# Patient Record
Sex: FEMALE | Race: ASIAN | Employment: OTHER | URBAN - METROPOLITAN AREA
[De-identification: names, ages, dates, MRNs, and addresses within clinical notes are randomized per-mention and may not be internally consistent; named-entity substitution may affect disease eponyms.]

---

## 2018-08-13 ENCOUNTER — OFFICE VISIT (OUTPATIENT)
Dept: OBGYN CLINIC | Facility: CLINIC | Age: 83
End: 2018-08-13
Payer: MEDICARE

## 2018-08-13 VITALS
BODY MASS INDEX: 21.79 KG/M2 | RESPIRATION RATE: 14 BRPM | DIASTOLIC BLOOD PRESSURE: 84 MMHG | WEIGHT: 123 LBS | HEART RATE: 80 BPM | HEIGHT: 63 IN | TEMPERATURE: 98 F | SYSTOLIC BLOOD PRESSURE: 122 MMHG

## 2018-08-13 DIAGNOSIS — Z46.89 ENCOUNTER FOR FITTING AND ADJUSTMENT OF PESSARY: Primary | ICD-10-CM

## 2018-08-13 PROCEDURE — 99203 OFFICE O/P NEW LOW 30 MIN: CPT | Performed by: OBSTETRICS & GYNECOLOGY

## 2018-08-13 NOTE — PROGRESS NOTES
CHIEF COMPLAINT:   Patient presents with need for new pessary  HPI:   Pita Martel is a 81year old  No LMP recorded (approximate). Patient is postmenopausal. who presents with fitting and adjustment for a new pessary.   Patient has not had a gynec

## 2018-08-27 ENCOUNTER — OFFICE VISIT (OUTPATIENT)
Dept: OBGYN CLINIC | Facility: CLINIC | Age: 83
End: 2018-08-27
Payer: MEDICARE

## 2018-08-27 ENCOUNTER — TELEPHONE (OUTPATIENT)
Dept: OBGYN CLINIC | Facility: CLINIC | Age: 83
End: 2018-08-27

## 2018-08-27 VITALS
DIASTOLIC BLOOD PRESSURE: 80 MMHG | SYSTOLIC BLOOD PRESSURE: 120 MMHG | HEART RATE: 68 BPM | RESPIRATION RATE: 16 BRPM | BODY MASS INDEX: 22.07 KG/M2 | WEIGHT: 123 LBS | HEIGHT: 62.75 IN

## 2018-08-27 DIAGNOSIS — Z01.419 ENCOUNTER FOR ANNUAL ROUTINE GYNECOLOGICAL EXAMINATION: Primary | ICD-10-CM

## 2018-08-27 DIAGNOSIS — Z12.39 SCREENING BREAST EXAMINATION: ICD-10-CM

## 2018-08-27 DIAGNOSIS — Z78.0 ASYMPTOMATIC POSTMENOPAUSAL STATUS: ICD-10-CM

## 2018-08-27 PROCEDURE — G0101 CA SCREEN;PELVIC/BREAST EXAM: HCPCS | Performed by: OBSTETRICS & GYNECOLOGY

## 2018-08-27 NOTE — PROGRESS NOTES
Kiko Meadows is a 81year old female  No LMP recorded. Patient is postmenopausal. Patient presents with:  Physical  .  She was recently fitted for a pessary which has not yet arrived. She has knee pain and wants to see physical therapy.   She woul Bisoprolol-Hydrochlorothiazide 5-6.25 MG Oral Tab, Take 1 Tab by mouth daily. , Disp: , Rfl:   •  Levothyroxine Sodium (SYNTHROID OR), , Disp: , Rfl:   •  Cholecalciferol (VITAMIN D) 1000 UNITS Oral Cap, Take  by mouth., Disp: , Rfl:   •  Calcium Carbonate- location, without lesions and prolapse  Bladder:  No fullness, masses or tenderness  Vagina:  Normal appearance without lesions, no abnormal discharge.   Pessary in the vagina  Cervix:  Normal without tenderness on motion  Uterus: normal in size, contour, p

## 2018-09-06 ENCOUNTER — TELEPHONE (OUTPATIENT)
Dept: OBGYN CLINIC | Facility: CLINIC | Age: 83
End: 2018-09-06

## 2018-09-10 ENCOUNTER — OFFICE VISIT (OUTPATIENT)
Dept: OBGYN CLINIC | Facility: CLINIC | Age: 83
End: 2018-09-10
Payer: MEDICARE

## 2018-09-10 VITALS
RESPIRATION RATE: 12 BRPM | WEIGHT: 122 LBS | TEMPERATURE: 98 F | SYSTOLIC BLOOD PRESSURE: 136 MMHG | BODY MASS INDEX: 21.89 KG/M2 | HEIGHT: 62.5 IN | HEART RATE: 68 BPM | DIASTOLIC BLOOD PRESSURE: 92 MMHG

## 2018-09-10 DIAGNOSIS — N81.89 PELVIC RELAXATION: Primary | ICD-10-CM

## 2018-09-10 PROCEDURE — 57160 INSERT PESSARY/OTHER DEVICE: CPT | Performed by: OBSTETRICS & GYNECOLOGY

## 2018-09-10 PROCEDURE — A4562 PESSARY, NON RUBBER,ANY TYPE: HCPCS | Performed by: OBSTETRICS & GYNECOLOGY

## 2018-09-10 NOTE — PROGRESS NOTES
Patient is here for insertion and fitting of her new pessary. Her old one was removed cleaned and was given to the patient. The new pessary was placed without any difficulty. PE: No vaginal abrasions or any irritation.   Patient was instructed to clean t

## 2020-11-23 DIAGNOSIS — M17.11 PRIMARY OSTEOARTHRITIS OF RIGHT KNEE: Primary | ICD-10-CM

## 2020-11-24 ENCOUNTER — TELEPHONE (OUTPATIENT)
Dept: ORTHOPEDICS CLINIC | Facility: CLINIC | Age: 85
End: 2020-11-24

## 2020-11-24 NOTE — TELEPHONE ENCOUNTER
Patient is coming in for right hip/knee pain - will be here a half an hour piror to appt    Future Appointments   Date Time Provider Janna Ureña   11/28/2020 11:30 AM Joe Bledsoe MD [facility]

## 2021-05-26 ENCOUNTER — OFFICE VISIT (OUTPATIENT)
Dept: ORTHOPEDICS CLINIC | Facility: CLINIC | Age: 86
End: 2021-05-26
Payer: MEDICARE

## 2021-05-26 ENCOUNTER — HOSPITAL ENCOUNTER (OUTPATIENT)
Dept: GENERAL RADIOLOGY | Age: 86
Discharge: HOME | End: 2021-05-26
Attending: ORTHOPAEDIC SURGERY
Payer: MEDICARE

## 2021-05-26 VITALS — BODY MASS INDEX: 20.34 KG/M2 | WEIGHT: 114.81 LBS | HEART RATE: 74 BPM | OXYGEN SATURATION: 98 % | HEIGHT: 63 IN

## 2021-05-26 DIAGNOSIS — M17.11 PRIMARY OSTEOARTHRITIS OF RIGHT KNEE: Primary | ICD-10-CM

## 2021-05-26 DIAGNOSIS — M79.604 CHRONIC PAIN OF RIGHT LOWER EXTREMITY: ICD-10-CM

## 2021-05-26 DIAGNOSIS — G89.29 CHRONIC PAIN OF RIGHT LOWER EXTREMITY: ICD-10-CM

## 2021-05-26 DIAGNOSIS — M17.11 PRIMARY OSTEOARTHRITIS OF RIGHT KNEE: ICD-10-CM

## 2021-05-26 PROCEDURE — 73564 X-RAY EXAM KNEE 4 OR MORE: CPT | Performed by: ORTHOPAEDIC SURGERY

## 2021-05-26 PROCEDURE — 20610 DRAIN/INJ JOINT/BURSA W/O US: CPT | Performed by: ORTHOPAEDIC SURGERY

## 2021-05-26 PROCEDURE — 99203 OFFICE O/P NEW LOW 30 MIN: CPT | Performed by: ORTHOPAEDIC SURGERY

## 2021-05-26 RX ORDER — TRIAMCINOLONE ACETONIDE 40 MG/ML
40 INJECTION, SUSPENSION INTRA-ARTICULAR; INTRAMUSCULAR ONCE
Status: COMPLETED | OUTPATIENT
Start: 2021-05-26 | End: 2021-05-26

## 2021-05-26 RX ADMIN — TRIAMCINOLONE ACETONIDE 40 MG: 40 INJECTION, SUSPENSION INTRA-ARTICULAR; INTRAMUSCULAR at 17:23:00

## 2021-05-27 NOTE — H&P
[facility] New Patient Note    CC: Patient presents with:  Knee Pain: RIGHT KNEE PAIN   Hip Pain: RIGHT HIP PAIN  Back Pain      HPI: This 81year old female, mother-in-law of Dr. Zoie Stephenson, presents today with complaints of right knee and right lower above      Physical Exam:    Pulse 74   Ht 5' 3\" (1.6 m)   Wt 114 lb 12.8 oz (52.1 kg)   SpO2 98%   BMI 20.34 kg/m²   Constitutional: Awake, alert, no distress. Psychological: Appropriate affect. Respiratory: Unlabored breathing.   Stance: No coronal makayla potential diagnostic and therapeutic steroid injection to the knee in order to assess response. If she does get improvement from the injection, we can continue treatment for osteoarthritis of the knee.   If she does not get improvement, then I would recomm

## 2021-06-16 ENCOUNTER — OFFICE VISIT (OUTPATIENT)
Dept: ORTHOPEDICS CLINIC | Facility: CLINIC | Age: 86
End: 2021-06-16
Payer: MEDICARE

## 2021-06-16 ENCOUNTER — HOSPITAL ENCOUNTER (OUTPATIENT)
Dept: GENERAL RADIOLOGY | Age: 86
Discharge: HOME | End: 2021-06-16
Attending: ORTHOPAEDIC SURGERY
Payer: MEDICARE

## 2021-06-16 VITALS — WEIGHT: 113 LBS | OXYGEN SATURATION: 98 % | HEIGHT: 63 IN | BODY MASS INDEX: 20.02 KG/M2 | HEART RATE: 76 BPM

## 2021-06-16 DIAGNOSIS — M25.562 CHRONIC PAIN OF LEFT KNEE: ICD-10-CM

## 2021-06-16 DIAGNOSIS — G89.29 CHRONIC PAIN OF LEFT KNEE: ICD-10-CM

## 2021-06-16 DIAGNOSIS — G89.29 CHRONIC PAIN OF RIGHT LOWER EXTREMITY: ICD-10-CM

## 2021-06-16 DIAGNOSIS — M79.604 CHRONIC PAIN OF RIGHT LOWER EXTREMITY: ICD-10-CM

## 2021-06-16 DIAGNOSIS — M17.11 PRIMARY OSTEOARTHRITIS OF RIGHT KNEE: Primary | ICD-10-CM

## 2021-06-16 DIAGNOSIS — M17.12 PRIMARY OSTEOARTHRITIS OF LEFT KNEE: ICD-10-CM

## 2021-06-16 PROCEDURE — 99213 OFFICE O/P EST LOW 20 MIN: CPT | Performed by: ORTHOPAEDIC SURGERY

## 2021-06-16 PROCEDURE — 73564 X-RAY EXAM KNEE 4 OR MORE: CPT | Performed by: ORTHOPAEDIC SURGERY

## 2021-06-16 PROCEDURE — 20610 DRAIN/INJ JOINT/BURSA W/O US: CPT | Performed by: ORTHOPAEDIC SURGERY

## 2021-06-16 RX ORDER — HYDROCHLOROTHIAZIDE 12.5 MG/1
CAPSULE, GELATIN COATED ORAL
COMMUNITY
Start: 2021-02-23

## 2021-06-16 RX ORDER — ASPIRIN 81 MG/1
81 TABLET ORAL DAILY
COMMUNITY
Start: 2019-11-30

## 2021-06-16 RX ORDER — METHYLPREDNISOLONE 4 MG/1
TABLET ORAL
Qty: 21 TABLET | Refills: 0 | Status: SHIPPED | OUTPATIENT
Start: 2021-06-16 | End: 2021-07-02

## 2021-06-16 RX ADMIN — TRIAMCINOLONE ACETONIDE 40 MG: 40 INJECTION, SUSPENSION INTRA-ARTICULAR; INTRAMUSCULAR at 16:49:00

## 2021-06-16 NOTE — PROGRESS NOTES
[facility] Progress Note    Subjective: Patient returns today with her daughter for repeat evaluation of her right knee.   She states that the injection in her right knee did help her right knee pain, however her right lower extremity pain did not keerthi she agrees with and this was ordered today. She will follow-up with physiatry for evaluation of her radiating right lower extremity pain, and will call us for follow-up on her knees if and when needed.     Anisha Luz MD, [address] [place] [address]

## 2021-06-17 RX ORDER — TRIAMCINOLONE ACETONIDE 40 MG/ML
40 INJECTION, SUSPENSION INTRA-ARTICULAR; INTRAMUSCULAR ONCE
Status: COMPLETED | OUTPATIENT
Start: 2021-06-17 | End: 2021-06-16

## 2021-07-02 ENCOUNTER — OFFICE VISIT (OUTPATIENT)
Dept: PHYSICAL MEDICINE AND REHAB | Facility: CLINIC | Age: 86
End: 2021-07-02
Payer: MEDICARE

## 2021-07-02 VITALS
HEIGHT: 63 IN | OXYGEN SATURATION: 98 % | RESPIRATION RATE: 16 BRPM | BODY MASS INDEX: 20.02 KG/M2 | WEIGHT: 113 LBS | SYSTOLIC BLOOD PRESSURE: 132 MMHG | DIASTOLIC BLOOD PRESSURE: 80 MMHG | HEART RATE: 80 BPM

## 2021-07-02 DIAGNOSIS — M54.16 RIGHT LUMBAR RADICULITIS: Primary | ICD-10-CM

## 2021-07-02 PROCEDURE — 99204 OFFICE O/P NEW MOD 45 MIN: CPT | Performed by: PHYSICAL MEDICINE & REHABILITATION

## 2021-07-02 NOTE — PATIENT INSTRUCTIONS
If you are not any better after physical therapy and the medrol dosepak please send me a message and I will order an MRI of the lumbar spine.

## 2021-07-02 NOTE — PROGRESS NOTES
HPI/Subjective:   Patient ID: Cuca Lynch is a 81year old female.     HPI    History/Other:   Review of Systems  Current Outpatient Medications   Medication Sig Dispense Refill   • hydrochlorothiazide 12.5 MG Oral Cap      • aspirin 81 MG Oral Tab EC Ta

## 2021-07-02 NOTE — H&P
History and Physical    C/C: right low back and leg pain    HPI: 58-year-old female with past medical history of hypertension presents with right lower back pain radiating into the right lower extremity into the dorsum of the foot.   The symptoms started ab reviewed via care everywhere; see below:  XR lumbar spine AP/lateral 10/30/2018:   Findings: 5 lumbar vertebral bodies are noted.  Mild levoscoliosis of the   lumbar spine is seen.  The hip joints are normal.  The disc spaces are   moderately narrowed at L4

## 2023-01-11 ENCOUNTER — OFFICE VISIT (OUTPATIENT)
Dept: ORTHOPEDICS CLINIC | Facility: CLINIC | Age: 88
End: 2023-01-11
Payer: MEDICARE

## 2023-01-11 VITALS — HEIGHT: 63 IN | HEART RATE: 78 BPM | WEIGHT: 113 LBS | BODY MASS INDEX: 20.02 KG/M2 | OXYGEN SATURATION: 97 %

## 2023-01-11 DIAGNOSIS — M17.0 PRIMARY OSTEOARTHRITIS OF BOTH KNEES: Primary | ICD-10-CM

## 2023-01-11 PROCEDURE — 20610 DRAIN/INJ JOINT/BURSA W/O US: CPT | Performed by: ORTHOPAEDIC SURGERY

## 2023-01-11 RX ORDER — TRIAMCINOLONE ACETONIDE 40 MG/ML
80 INJECTION, SUSPENSION INTRA-ARTICULAR; INTRAMUSCULAR ONCE
Status: COMPLETED | OUTPATIENT
Start: 2023-01-11 | End: 2023-01-11

## 2023-01-11 RX ADMIN — TRIAMCINOLONE ACETONIDE 80 MG: 40 INJECTION, SUSPENSION INTRA-ARTICULAR; INTRAMUSCULAR at 15:51:00

## 2023-01-11 NOTE — PROCEDURES
Patient returns with her daughter. She had excellent relief from the last set of injections. She would like to repeat steroid injection to both knees today. After informed consent, the patient's right and left knees were marked, locally anesthetized with skin refrigerant, prepped with topical antiseptic, and injected with a mixture of 1mL 40mg/mL Kenalog, 2mL 1% lidocaine and 2mL 0.5% marcaine through the inferolateral portal.  A band-aid was applied. The patient tolerated the procedure well.     María Hightower MD, [address] [facility]  Phone [phone]  Fax [phone]

## 2023-02-23 ENCOUNTER — TELEPHONE (OUTPATIENT)
Dept: PHYSICAL MEDICINE AND REHAB | Facility: CLINIC | Age: 88
End: 2023-02-23

## 2023-02-23 ENCOUNTER — OFFICE VISIT (OUTPATIENT)
Dept: PHYSICAL MEDICINE AND REHAB | Facility: CLINIC | Age: 88
End: 2023-02-23
Payer: MEDICARE

## 2023-02-23 VITALS
SYSTOLIC BLOOD PRESSURE: 110 MMHG | DIASTOLIC BLOOD PRESSURE: 62 MMHG | BODY MASS INDEX: 20.02 KG/M2 | HEIGHT: 63 IN | WEIGHT: 113 LBS

## 2023-02-23 DIAGNOSIS — M48.062 LUMBAR STENOSIS WITH NEUROGENIC CLAUDICATION: ICD-10-CM

## 2023-02-23 DIAGNOSIS — M54.16 RIGHT LUMBAR RADICULITIS: Primary | ICD-10-CM

## 2023-02-23 PROCEDURE — 99214 OFFICE O/P EST MOD 30 MIN: CPT | Performed by: PHYSICAL MEDICINE & REHABILITATION

## 2023-02-23 RX ORDER — HYDRALAZINE HYDROCHLORIDE 10 MG/1
10 TABLET, FILM COATED ORAL 2 TIMES DAILY
COMMUNITY
Start: 2023-02-15

## 2023-02-23 RX ORDER — AMLODIPINE BESYLATE 2.5 MG/1
2.5 TABLET ORAL 2 TIMES DAILY
COMMUNITY
Start: 2022-12-21

## 2023-02-23 RX ORDER — LOSARTAN POTASSIUM 100 MG/1
100 TABLET ORAL DAILY
COMMUNITY
Start: 2023-02-15

## 2023-02-23 RX ORDER — MECLIZINE HYDROCHLORIDE 25 MG/1
25 TABLET ORAL
COMMUNITY
Start: 2022-06-23

## 2023-02-23 NOTE — TELEPHONE ENCOUNTER
Per Medicare Guidelines -no authorization is required for Bilateral L5 transforaminal epidural steroid injection under fluoroscopy CPT 65792-98    Status: Authorization is not required however may be subject to review once claim is submitted-Covered Benefit   Per Dr. Radha Max at [facility] w/ IVCS

## 2023-02-28 ENCOUNTER — TELEPHONE (OUTPATIENT)
Dept: PHYSICAL MEDICINE AND REHAB | Facility: CLINIC | Age: 88
End: 2023-02-28

## 2023-02-28 NOTE — TELEPHONE ENCOUNTER
Patient has been scheduled for Bilateral L5 transforaminal epidural steroid injection under fluoroscopy on 3/6/23 at the [facility] with Ralph Roles. -Anesthesia type: IVCS. -If scheduling [facility] covid testing required for all procedures whether patient is vaccinated or not. NA  -Patient informed not to eat or drink anything after midnight the night prior to the procedure, if being sedated. (Patient informed to fast 12 hours prior to procedure with IVCS/MAC. )   -Patient was advised that if he/she does receive the covid vaccine it needs to be at least 2 weeks before or after the injection. NA  -Medications and allergies reviewed. -Patient reminded to hold NSAIDs (Ibuprofen, ASA 81, Aleve, Naproxen, Mobic, Diclofenac, Etodolac, Celebrex etc.) for 3 days prior to [facility]  if BMI is greater than 35. For Cervical injections only hold multivitamins, Vitamin E, Fish Oil, Phentermine (Lomaira) for 7 days prior to injection and NSAIDS.  mg to be held for 7 days prior to injections.  -If patient is receiving MAC/IVCS Phentermine Amelie Matt) will need to be held for 7 days prior to injection. NA  -If on blood thinner clearance has been received to hold this medication by provider. NA  -Patient informed he/she will need a  to and from procedure. -[facility] is located in the [facility] 1st floor. Patient may park in the yellow or purple parking. Patient verbalized understanding and agrees with plan.  -----> Scheduled in Epic: Yes  -----> Scheduled in [employer]:  Yes

## 2023-03-06 ENCOUNTER — OFFICE VISIT (OUTPATIENT)
Dept: SURGERY | Facility: CLINIC | Age: 88
End: 2023-03-06
Payer: MEDICARE

## 2023-03-06 DIAGNOSIS — M48.062 LUMBAR STENOSIS WITH NEUROGENIC CLAUDICATION: Primary | ICD-10-CM

## 2023-03-06 NOTE — PROCEDURES
[facility]    BILATERAL LUMBAR TRANSFORAMINAL   NAME:  Bella Fierro    MR #:    [id number] :  1935     PHYSICIAN:  Juan Alberto Limon DO        Operative Report    DATE OF PROCEDURE: 3/6/2023   PREOPERATIVE DIAGNOSES: 1. Lumbar stenosis with neurogenic claudication        POSTOPERATIVE DIAGNOSES:   1. Lumbar stenosis with neurogenic claudication        PROCEDURES: Bilateral L5 transforaminal epidural steroid injections done under fluoroscopic guidance with contrast enhancement. SURGEON: Juan Alberto Limon DO   ANESTHESIA: IV conscious sedation   INDICATIONS: Chronic low back and leg pain, worse with walking. Central stenosis at L4-5. OPERATIVE PROCEDURE:  Written consent was obtained from the patient. The patient was brought into the operating room and placed in the prone position on the fluoroscopy table with pillow underneath her abdomen. The patient's skin was cleaned and draped in a normal sterile fashion. Using AP fluoroscopy, all five lumbar vertebrae were identified. When the fifth vertebra was identified, fluoroscopy was left anterior obliqued opening up the right L5-S1 intervertebral foramen. At this point in time, the patient's skin was anesthetized with 1% PF lidocaine without epinephrine. Then, a 3.5 inch, 22-gauge spinal needle was inserted and directed towards the right L5-S1 intervertebral foramen. When it felt to be in good position, AP fluoroscopy was used to advance the needle to the 6 o'clock position on the right L5 pedicle. At this point in time, Omnipaque-240 contrast was used to obtain a good epidurogram indicating correct needle placement. Then, aspiration was performed. No blood, fluid, or air was aspirated. Then, the patient was injected with a 3 cc solution of 1.5 cc of 6 mg/cc of celestone and 1.5 cc of 1% PF lidocaine without epinephrine. After this, the needle was removed.   Then  fluoroscopy was right anterior obliqued opening up the left L5-S1 intervertebral foramen. At this point in time, the patient's skin was anesthetized with 1 to 2 cc of 1% PF lidocaine without epinephrine. Then, a 3.5 inch, 22-gauge spinal needle was inserted and directed towards the left L5-S1 intervertebral foramen. When it felt to be in good position, AP fluoroscopy was used to advance the needle to the 6 o'clock position on the left L5 pedicle. At this point in time, Omnipaque-240 contrast was used to obtain a good epidurogram indicating correct needle placement. Then, aspiration was performed. No blood, fluid, or air was aspirated. Then, the patient was injected with a 3 cc solution of 1.5 cc of 6 mg/cc of celestone and 1.5 cc of 1% PF lidocaine without epinephrine. After this, the needle was removed. The patient's skin was cleaned. Band-Aids were applied. The patient was transferred to the cart and into [facility]. The patient was given discharge instructions and will follow up in the clinic as scheduled. Throughout the whole procedure, the patient's pulse oximetry and vital signs were monitored and they remained completely stable. Also, throughout the whole procedure, prior to injection of any medication, aspiration was performed. No blood, fluid, or air was aspirated at anytime. IV conscious sedation was utilized for this procedure. A total of 1mg of versed was administered over 20 minutes.     Augusta Mitchell DO  Physical Medicine and [address] [place] [address]

## 2023-06-26 ENCOUNTER — OFFICE VISIT (OUTPATIENT)
Dept: ORTHOPEDICS CLINIC | Facility: CLINIC | Age: 88
End: 2023-06-26
Payer: MEDICARE

## 2023-06-26 DIAGNOSIS — M17.0 PRIMARY OSTEOARTHRITIS OF BOTH KNEES: Primary | ICD-10-CM

## 2023-06-26 RX ORDER — TRIAMCINOLONE ACETONIDE 40 MG/ML
80 INJECTION, SUSPENSION INTRA-ARTICULAR; INTRAMUSCULAR ONCE
Status: COMPLETED | OUTPATIENT
Start: 2023-06-26 | End: 2023-06-26

## 2023-06-26 RX ORDER — GABAPENTIN 100 MG/1
200 CAPSULE ORAL 2 TIMES DAILY
COMMUNITY
Start: 2023-04-18

## 2023-06-26 RX ADMIN — TRIAMCINOLONE ACETONIDE 80 MG: 40 INJECTION, SUSPENSION INTRA-ARTICULAR; INTRAMUSCULAR at 15:04:00

## 2023-06-26 NOTE — PROCEDURES
After informed consent, the patient's right and left knees were marked, locally anesthetized with skin refrigerant, prepped with topical antiseptic, and injected with a mixture of 1mL 40mg/mL Kenalog, 2mL 1% lidocaine and 2mL 0.5% marcaine through the inferolateral portal.  A band-aid was applied. The patient tolerated the procedure well.     Jose Uribe MD, [address] [facility]  Phone [phone]  Fax [phone]

## 2024-06-18 ENCOUNTER — TELEPHONE (OUTPATIENT)
Dept: NEUROLOGY | Facility: CLINIC | Age: 89
End: 2024-06-18

## 2024-06-18 NOTE — TELEPHONE ENCOUNTER
Message left on mike's voice mail to call back to schedule appointment for her mom, Rebeca, to see dr Nunn on Monday, 6/24 in [place]. Can double book at 10:30 or 11:30.    Shellie Nunn MD  [facility] Nurse  Please call patient daughter Breanne [phone] for appt- June 24 th we can add her on.  Thanks

## 2024-06-24 ENCOUNTER — LAB ENCOUNTER (OUTPATIENT)
Dept: LAB | Age: 89
End: 2024-06-24
Attending: Other

## 2024-06-24 ENCOUNTER — OFFICE VISIT (OUTPATIENT)
Dept: NEUROLOGY | Facility: CLINIC | Age: 89
End: 2024-06-24

## 2024-06-24 VITALS
SYSTOLIC BLOOD PRESSURE: 120 MMHG | BODY MASS INDEX: 22.26 KG/M2 | WEIGHT: 125.63 LBS | DIASTOLIC BLOOD PRESSURE: 74 MMHG | HEIGHT: 63 IN | OXYGEN SATURATION: 96 % | RESPIRATION RATE: 16 BRPM | HEART RATE: 74 BPM

## 2024-06-24 DIAGNOSIS — R41.9 NEUROCOGNITIVE DISORDER: ICD-10-CM

## 2024-06-24 DIAGNOSIS — R41.9 NEUROCOGNITIVE DISORDER: Primary | ICD-10-CM

## 2024-06-24 DIAGNOSIS — R41.3 MEMORY LOSS: ICD-10-CM

## 2024-06-24 LAB
TSI SER-ACNC: 1.43 MIU/ML (ref 0.36–3.74)
VIT B12 SERPL-MCNC: 644 PG/ML (ref 193–986)

## 2024-06-24 PROCEDURE — 82607 VITAMIN B-12: CPT

## 2024-06-24 PROCEDURE — 36415 COLL VENOUS BLD VENIPUNCTURE: CPT

## 2024-06-24 PROCEDURE — 84443 ASSAY THYROID STIM HORMONE: CPT

## 2024-06-24 PROCEDURE — 99204 OFFICE O/P NEW MOD 45 MIN: CPT | Performed by: OTHER

## 2024-06-24 RX ORDER — DONEPEZIL HYDROCHLORIDE 5 MG/1
5 TABLET, FILM COATED ORAL NIGHTLY
Qty: 30 TABLET | Refills: 2 | Status: SHIPPED | OUTPATIENT
Start: 2024-06-24

## 2024-06-24 NOTE — PATIENT INSTRUCTIONS
Refill policies:    Allow 2-3 business days for refills; controlled substances may take longer.  Contact your pharmacy at least 5 days prior to running out of medication and have them send an electronic request or submit request through the “request refill” option in your [email or web address] account.  Refills are not addressed on weekends; covering physicians do not authorize routine medications on weekends.  No narcotics or controlled substances are refilled after noon on Fridays or by on call physicians.  By law, narcotics must be electronically prescribed.  A 30 day supply with no refills is the maximum allowed.  If your prescription is due for a refill, you may be due for a follow up appointment.  To best provide you care, patients receiving routine medications need to be seen at least once a year.  Patients receiving narcotic/controlled substance medications need to be seen at least once every 3 months.  In the event that your preferred pharmacy does not have the requested medication in stock (e.g. Backordered), it is your responsibility to find another pharmacy that has the requested medication available.  We will gladly send a new prescription to that pharmacy at your request.    Scheduling Tests:    If your physician has ordered radiology tests such as MRI or CT scans, please contact Central Scheduling at [phone] right away to schedule the test.  Once scheduled, the [facility] Centralized Referral Team will work with your insurance carrier to obtain pre-certification or prior authorization.  Depending on your insurance carrier, approval may take 3-10 days.  It is highly recommended patients assure they have received an authorization before having a test performed.  If test is done without insurance authorization, patient may be responsible for the entire amount billed.      Precertification and Prior Authorizations:  If your physician has recommended that you have a procedure or additional testing performed the [facility]  Centralized Referral Team will contact your insurance carrier to obtain pre-certification or prior authorization.    You are strongly encouraged to contact your insurance carrier to verify that your procedure/test has been approved and is a COVERED benefit.  Although the [facility] Centralized Referral Team does its due diligence, the insurance carrier gives the disclaimer that \"Although the procedure is authorized, this does not guarantee payment.\"    Ultimately the patient is responsible for payment.   Thank you for your understanding in this matter.  Paperwork Completion:  If you require FMLA or disability paperwork for your recovery, please make sure to either drop it off or have it faxed to our office at [phone]. Be sure the form has your name and date of birth on it.  The form will be faxed to our Forms Department and they will complete it for you.  There is a 25$ fee for all forms that need to be filled out.  Please be aware there is a 10-14 day turnaround time.  You will need to sign a release of information (NAVNEET) form if your paperwork does not come with one.  You may call the Forms Department with any questions at [phone].  Their fax number is [phone].

## 2024-06-24 NOTE — PROGRESS NOTES
HPI:    Patient ID: Rebeca Clarke is a 89 year old female.    HPI  Rebeca Clarke is a 89-year-old female with history of hypertension and thyroid disease who presented for evaluation of memory loss.  Patient is here along with her daughter Breanne who reports family has noticed short-term memory problem going on for quite some time and progressively getting worse but also had a UTI recently.  She states that she often forgets names and memory not as sharp as before  Family have also noticed anxiety and sad mood worse over the last 6+ months.  Ms Clarke lives with her  and independent with her activities of daily living.  She states she does not drive anymore, still able to cook makes meals and do basic household chores without any problem. She states her  manages finances and takes care of bills  She denies any mood issues, depression or anxiety    ROS for Dementia:  No history of TIA or stroke.   No major head injury  No sleep disturbance  Family history positive, her sister has dementia        HISTORY:  Past Medical History:    HYPOTHYROIDISM    Osteoarthritis    SOB (shortness of breath)    Thyroid disease    Unspecified essential hypertension      Past Surgical History:   Procedure Laterality Date    Cholecystectomy      D & c      Other surgical history  1986    fungus on lung      Family History   Problem Relation Age of Onset    Diabetes Mother     Arthritis Mother     Heart Disorder Mother     Diabetes Maternal Grandmother       Social History     Socioeconomic History    Marital status:     Number of children: 4   Occupational History    Occupation: Retired   Tobacco Use    Smoking status: Never    Smokeless tobacco: Never   Vaping Use    Vaping status: Never Used   Substance and Sexual Activity    Alcohol use: No    Drug use: No    Sexual activity: Never   Other Topics Concern    Caffeine Concern No     Comment: Occas tea    Exercise Yes     Comment: Walks    Seat Belt Yes         Review of Systems   Constitutional: Negative.    HENT: Negative.     Eyes: Negative.    Respiratory: Negative.     Cardiovascular: Negative.    Gastrointestinal: Negative.    Endocrine: Negative.    Genitourinary: Negative.    Musculoskeletal: Negative.    Skin: Negative.    Allergic/Immunologic: Negative.    Neurological: Negative.         + memory loss   Hematological: Negative.    Psychiatric/Behavioral: Negative.  Negative for sleep disturbance.    All other systems reviewed and are negative.           Current Outpatient Medications   Medication Sig Dispense Refill    amLODIPine 2.5 MG Oral Tab Take 1 tablet (2.5 mg total) by mouth 2 (two) times daily.      losartan 100 MG Oral Tab Take 1 tablet (100 mg total) by mouth daily.      meclizine 25 MG Oral Tab Take 1 tablet (25 mg total) by mouth daily as needed.      NON FORMULARY SUPER C MULTIVITAMIN      Levothyroxine Sodium (SYNTHROID OR) Take by mouth daily.      Cholecalciferol (VITAMIN D) 1000 UNITS Oral Cap Take by mouth.       Allergies:  Allergies   Allergen Reactions    Metoprolol OTHER (SEE COMMENTS)     bradycardia    Sulfa Antibiotics RASH     PHYSICAL EXAM:   Physical Exam    Blood pressure 120/74, pulse 74, resp. rate 16, height 63\", weight 125 lb 9.6 oz (57 kg), SpO2 96%, not currently breastfeeding.    General Appearance: Well nourished, well developed, no apparent distress.   HEENT: Normocephalic and atraumatic. Normal sclera.   Cardiovascular: Normal rate, regular rhythm and normal heart sounds.    Pulmonary/Chest: Effort normal and breath sounds normal.   Abdominal: Soft. Bowel sounds are normal.   Skin: dry, clean and intact  Ext: peripheral pulses present  Psych: normal mood and affect    Neurological:  Patient is awake, alert and oriented to person, place and time   Normal speech and language.    Steven Cognitive Assessment:  Visuospatial/Executive ( of 5): 2  Naming ( of 3): 2  Attention ( of 6): 6  Naming ( of 3): 2  Abstraction ( of  2): 1  Delayed Recall ( of 5): 2  Orientation ( of 6): 5  Extra point for <= 12 Yr Education ( of 1): 0  Total:  Total ( of 30): 21    Cranial Nerves:   II: Visual fields: normal  III: Pupils: equal, round, reactive to light  III,IV,VI: Extra Ocular Movements: intact  V: Facial sensation: intact  VII: Facial strength: intact  VIII: Hearing: intact  IX: Palate: intact  XI: Shoulder shrug: intact  XII: Tongue movement: normal    Motor: Normal tone. Strength is  5 out of 5 in all extremities bilaterally.    Sensory: Sensory examination is normal to light touch and pinprick     Coordination: Grossly intact    Gait: steady, uses a walker for support       TESTS/IMAGING:     none    ASSESSMENT/PLAN:         ICD-10-CM    1. Neurocognitive disorder  R41.9 MRI BRAIN (CPT=70551)     TSH W Reflex To Free T4     Vitamin B12     Psychology Referral - In Network      2. Memory loss  R41.3         Ms Clarke is a 89-year-old pleasant female with history of hypertension and thyroid disorder who presented for evaluation of progressive short-term memory problem.  Cognitive assessment done here in the office demonstrates impairment in processing speed, visual-spatial skills, naming and word recall.  Findings are concerning for Alzheimer's type of dementia      We recommend MRI of the brain to assess the vascular burden and brain atrophy  We also recommended a neuropsychology evaluation for comprehensive testing  Start donepezil 5 mg nightly and increase the dose slowly to 10 mg as tolerated.  Side effect explained  We discussed briefly about SSRIs either escitalopram or zoloft. Patient and daughter would like to hold on it    Encourage cognitive exercises, group activities and healthy lifestyle    No orders of the defined types were placed in this encounter.      Thank you for allowing us to participate in your patient's care.      Shellie Nunn MD  [facility] [place]          Meds This Visit:  Requested Prescriptions       No prescriptions requested or ordered in this encounter       Imaging & Referrals:  None     ID#[id number]

## 2024-06-25 ENCOUNTER — TELEPHONE (OUTPATIENT)
Dept: NEUROLOGY | Facility: CLINIC | Age: 89
End: 2024-06-25

## 2024-06-25 NOTE — TELEPHONE ENCOUNTER
Detailed message left on patient's voice mail (ok per HIPAA consent) that lab results normal. Also attempted to reach daughter ronnie but no answer and no voice mail.

## 2024-07-02 ENCOUNTER — HOSPITAL ENCOUNTER (OUTPATIENT)
Dept: MRI IMAGING | Age: 89
Discharge: HOME | End: 2024-07-02
Attending: Other
Payer: MEDICARE

## 2024-07-02 DIAGNOSIS — R41.9 NEUROCOGNITIVE DISORDER: ICD-10-CM

## 2024-07-02 PROCEDURE — 70551 MRI BRAIN STEM W/O DYE: CPT | Performed by: OTHER

## 2024-09-18 DIAGNOSIS — R41.9 NEUROCOGNITIVE DISORDER: Primary | ICD-10-CM

## 2024-09-18 RX ORDER — DONEPEZIL HYDROCHLORIDE 5 MG/1
5 TABLET, FILM COATED ORAL NIGHTLY
Qty: 90 TABLET | Refills: 0 | Status: SHIPPED | OUTPATIENT
Start: 2024-09-18

## 2024-09-18 NOTE — TELEPHONE ENCOUNTER
Medication: donepezil 5 MG Oral Tab      Date of last refill: 6/24/24 (#30/2)  [place]     Last office visit: 6/24/24  Due back to clinic per last office note:  3 months (around 9/24/2024).   Date next office visit scheduled:    Future Appointments   Date Time Provider Department Center   9/27/2024  2:10 PM Shellie Nunn MD [facility]           Last OV note recommendation:        ICD-10-CM     1. Neurocognitive disorder  R41.9 MRI BRAIN (CPT=70551)       TSH W Reflex To Free T4       Vitamin B12       Psychology Referral - In Network       2. Memory loss  R41.3            Ms Clarke is a 89-year-old pleasant female with history of hypertension and thyroid disorder who presented for evaluation of progressive short-term memory problem.  Cognitive assessment done here in the office demonstrates impairment in processing speed, visual-spatial skills, naming and word recall.  Findings are concerning for Alzheimer's type of dementia        We recommend MRI of the brain to assess the vascular burden and brain atrophy  We also recommended a neuropsychology evaluation for comprehensive testing  Start donepezil 5 mg nightly and increase the dose slowly to 10 mg as tolerated.  Side effect explained  We discussed briefly about SSRIs either escitalopram or zoloft. Patient and daughter would like to hold on it     Encourage cognitive exercises, group activities and healthy lifestyle     No orders of the defined types were placed in this encounter.

## 2024-11-23 ENCOUNTER — APPOINTMENT (OUTPATIENT)
Dept: GENERAL RADIOLOGY | Facility: HOSPITAL | Age: 89
End: 2024-11-23
Attending: EMERGENCY MEDICINE
Payer: MEDICARE

## 2024-11-23 ENCOUNTER — HOSPITAL ENCOUNTER (EMERGENCY)
Facility: HOSPITAL | Age: 89
Discharge: HOME | End: 2024-11-23
Attending: EMERGENCY MEDICINE
Payer: MEDICARE

## 2024-11-23 VITALS
DIASTOLIC BLOOD PRESSURE: 83 MMHG | BODY MASS INDEX: 20.73 KG/M2 | SYSTOLIC BLOOD PRESSURE: 164 MMHG | OXYGEN SATURATION: 97 % | RESPIRATION RATE: 17 BRPM | WEIGHT: 117 LBS | HEIGHT: 63 IN | TEMPERATURE: 98 F | HEART RATE: 67 BPM

## 2024-11-23 DIAGNOSIS — M79.604 RIGHT LEG PAIN: Primary | ICD-10-CM

## 2024-11-23 PROCEDURE — 99284 EMERGENCY DEPT VISIT MOD MDM: CPT

## 2024-11-23 PROCEDURE — 99283 EMERGENCY DEPT VISIT LOW MDM: CPT

## 2024-11-23 PROCEDURE — 73552 X-RAY EXAM OF FEMUR 2/>: CPT | Performed by: EMERGENCY MEDICINE

## 2024-11-23 PROCEDURE — 73502 X-RAY EXAM HIP UNI 2-3 VIEWS: CPT | Performed by: EMERGENCY MEDICINE

## 2024-11-23 RX ORDER — HYDROCODONE BITARTRATE AND ACETAMINOPHEN 5; 325 MG/1; MG/1
1 TABLET ORAL ONCE
Status: COMPLETED | OUTPATIENT
Start: 2024-11-23 | End: 2024-11-23

## 2024-11-23 RX ORDER — DIAZEPAM 2 MG/1
2 TABLET ORAL EVERY 8 HOURS PRN
Qty: 9 TABLET | Refills: 0 | Status: SHIPPED | OUTPATIENT
Start: 2024-11-23 | End: 2024-11-23

## 2024-11-23 RX ORDER — DIAZEPAM 5 MG/1
2.5 TABLET ORAL ONCE
Status: COMPLETED | OUTPATIENT
Start: 2024-11-23 | End: 2024-11-23

## 2024-11-23 RX ORDER — LIDOCAINE 50 MG/G
1 PATCH TOPICAL EVERY 24 HOURS
Qty: 7 PATCH | Refills: 0 | Status: SHIPPED | OUTPATIENT
Start: 2024-11-23 | End: 2024-11-30

## 2024-11-23 RX ORDER — HYDROCODONE BITARTRATE AND ACETAMINOPHEN 5; 325 MG/1; MG/1
1 TABLET ORAL EVERY 8 HOURS PRN
Qty: 9 TABLET | Refills: 0 | Status: SHIPPED | OUTPATIENT
Start: 2024-11-23 | End: 2024-11-23

## 2024-11-23 RX ORDER — HYDROCODONE BITARTRATE AND ACETAMINOPHEN 5; 325 MG/1; MG/1
1 TABLET ORAL EVERY 8 HOURS PRN
Qty: 9 TABLET | Refills: 0 | Status: SHIPPED | OUTPATIENT
Start: 2024-11-23 | End: 2024-11-26

## 2024-11-23 RX ORDER — DIAZEPAM 2 MG/1
2 TABLET ORAL 3 TIMES DAILY PRN
Qty: 9 TABLET | Refills: 0 | Status: SHIPPED | OUTPATIENT
Start: 2024-11-23 | End: 2024-11-26

## 2024-11-23 RX ORDER — GABAPENTIN 300 MG/1
300 CAPSULE ORAL 3 TIMES DAILY
Qty: 15 CAPSULE | Refills: 0 | Status: SHIPPED | OUTPATIENT
Start: 2024-11-23 | End: 2024-11-28

## 2024-11-23 NOTE — ED QUICK NOTES
Discharge instructions and paper prescriptions given to pt and daughter. Pt and daughter verbalized understanding of home care, medication use and to follow up with PCP. Pt and daughter denied further questions or concerns. Pt discharged via wheelchair with daughter, pt discharged in stable condition.

## 2024-11-23 NOTE — ED PROVIDER NOTES
Patient Seen in: [facility] Emergency Department    History     Chief Complaint   Patient presents with    Pain     Stated Complaint:      HPI    89-year-old female past medical history of hypothyroid, osteoarthritis, hypertension, lumbar radiculopathy with recent admission for L2 compression fracture status post IR kyphoplasty 10/25 presenting for evaluation of right gluteal pain as noted while being assisted by SNF staff to change into pants.  No radicular complaints or focal weakness/paresthesias.  No anticoagulant or antiplatelet use.  No direct trauma, no incontinence.  Independently ambulatory at baseline; patient with mechanical fall and L2 compression fracture as noted status post kyphoplasty and discharged to subacute rehabwith discharge summary detailing prescriptions for gabapentin, duloxetine and as needed oxycodone.    Past Medical History:    HYPOTHYROIDISM    Osteoarthritis    SOB (shortness of breath)    Thyroid disease    Unspecified essential hypertension       Past Surgical History:   Procedure Laterality Date    Cholecystectomy      D & c      Other surgical history  1986    fungus on lung            Family History   Problem Relation Age of Onset    Diabetes Mother     Arthritis Mother     Heart Disorder Mother     Diabetes Maternal Grandmother        Social History     Socioeconomic History    Marital status:     Number of children: 4   Occupational History    Occupation: Retired   Tobacco Use    Smoking status: Never    Smokeless tobacco: Never   Vaping Use    Vaping status: Never Used   Substance and Sexual Activity    Alcohol use: No    Drug use: No    Sexual activity: Never   Other Topics Concern    Caffeine Concern No     Comment: Occas tea    Exercise Yes     Comment: Walks    Seat Belt Yes     Social Drivers of Health     Food Insecurity: Low Risk  (10/18/2024)    Received from [facility]    Food Insecurity     Have there been times that your food ran  out, and you didn't have money to get more?: No     Are there times that you worry that this might happen?: No   Transportation Needs: Low Risk  (10/18/2024)    Received from [facility]    Transportation Needs     Do you have trouble getting transportation to medical appointments?: No     How do you normally get to and from your appointments?: Family/Friend   Housing Stability: Low Risk  (10/18/2024)    Received from [facility]    Housing Stability     Are you worried that your electric, gas, oil, or water might be shut off?: No     Are you concerned about having a safe and reliable place to live?: No       Review of Systems :  Constitutional: As per HPI  Musculoskeletal: (+) right leg pain.    Positive for stated complaint:   Other systems are as noted in HPI.  Constitutional and vital signs reviewed.      All other systems reviewed and negative except as noted above.    PSFH elements reviewed from today and agreed except as otherwise stated in HPI.    Physical Exam     ED Triage Vitals [11/23/24 0730]   BP (!) 169/86   Pulse 76   Resp 19   Temp 98.3 °F (36.8 °C)   Temp src Oral   SpO2 96 %   O2 Device None (Room air)       Current:BP (!) 169/86   Pulse 76   Temp 98.3 °F (36.8 °C) (Oral)   Resp 19   Ht 160 cm (5' 3\")   Wt 53.1 kg   SpO2 96%   BMI 20.73 kg/m²         Physical Exam   Constitutional: No distress. Anxious, tearful.  HEENT: MMM.  Head: Normocephalic.   Eyes: No injection.   Cardiovascular: Right lower extremity with 2+ DP/PT pulses.  Pulmonary/Chest: Effort normal.   Musculoskeletal: No gross deformity.  No midline thoracolumbar tenderness/step-off/deformity.  Right sciatic notch/gluteal and lateral hip tenderness without cutaneous crepitance change with soft compartments and with intact range of motion.  Neurological: Alert.  Right lower extremity with 5/5 strength proximally and distally with 2+ patellar DTRs and intact EHL.  Skin: Skin is warm.    Psychiatric: Cooperative.  Anxious, tearful  Nursing note and vitals reviewed.        ED Course   Labs Reviewed - No data to display  XR HIP W OR WO PELVIS 2 OR 3 VIEWS, RIGHT (CPT=73502)    Result Date: 11/23/2024  PROCEDURE: XR HIP W OR WO PELVIS 2 OR 3 VIEWS, RIGHT (CPT=73502)  COMPARISON: None.  INDICATIONS: Right leg and hip pain post being assisted from chair to bed today.  TECHNIQUE: 3 views were obtained.   FINDINGS:  BONES: There is bilateral hip osteoarthritis slightly more advanced on the right.  There is bilateral medial and superior acetabular joint space narrowing without protrusio.  Sclerosis, osteophytes and heterotopic calcifications are present around both hips more advanced on the right.  Both femoral heads are grossly spherical.  Mild degenerative change in the lower lumbar spine.  No acute fracture of the bony pelvis or right hip. SOFT TISSUES: Negative. No visible soft tissue swelling. EFFUSION: None visible. OTHER: Negative.         CONCLUSION: No acute osseous abnormality of the pelvis or right hip.  Mild-moderate bilateral hip osteoarthritis, more advanced on the right.   Dictated by (CST): Pawan Williamson MD on 11/23/2024 at 9:51 AM     Finalized by (CST): Pawan Williamson MD on 11/23/2024 at 9:51 AM          XR FEMUR MIN 2 VIEWS RIGHT (CPT=73552)    Result Date: 11/23/2024  PROCEDURE: XR FEMUR MIN 2 VIEWS RIGHT (CPT=73552)  COMPARISON: None.  INDICATIONS: Right leg and hip pain post being assisted from chair to bed today.  TECHNIQUE: 2 views were obtained.   FINDINGS:  BONES: The osseous structures are demineralized.  There is no fracture or dislocation.  There is enthesopathy suspected along the greater trochanter of the femur.  There is mild pubic symphysis osteoarthritis.  There is moderate superior right hip joint space narrowing and mild marginal osteophyte formation.  There is mild distal quadriceps enthesopathy. SOFT TISSUES: Mild atherosclerotic calcifications are noted. No visible soft  tissue swelling. EFFUSION: None visible. OTHER: Negative.         CONCLUSION:  1. No fracture dislocation. 2. Moderate right hip joint osteoarthritis.    Dictated by (CST): Ace Moore MD on 11/23/2024 at 8:50 AM     Finalized by (CST): Ace Moore MD on 11/23/2024 at 8:52 AM                [facility]   DIFFERENTIAL DIAGNOSIS: After history and physical exam differential diagnosis includes but is not limited to avulsion fracture, musculoskeletal strain, radiculopathy.    Pulse ox: 96%:Normal on RA, as independently interpreted by myself    Medical Decision Making  Evaluation for right gluteal pain without neurovascular compromise/radicular complaints or red flag symptoms with symptom onset after moving patient to facilitate getting dressed.  Right lower extremity with soft compartments and without cutaneous change, radiography nonacute with patient resting comfortably with symptomatic improvement after medications, stable for discharge with ongoing outpatient follow-up for which patient/daughter comfortable with discharge plan of care.    Problems Addressed:  Right leg pain: acute illness or injury    Amount and/or Complexity of Data Reviewed  External Data Reviewed: radiology and notes.     Details: 10/19/2024 MRI lumbar, 10/26/2024 discharge summary reviewed  Radiology: ordered and independent interpretation performed. Decision-making details documented in ED Course.     Details: XR without obvious dislocation as independently interpreted by myself    Risk  Prescription drug management.      I was wearing at minimum a facemask and eye protection throughout this encounter with handwashing performed prior and after patient evaluation without personal hand/facial/oropharyngeal contact and gloves worn throughout encounter. See note and/or contact this provider for further PPE details.    Disposition and Plan     Clinical Impression:  1. Right leg pain        Disposition:  Discharge    Follow-up:  Your  PCP    Call  For followup and re-evaluation.      Medications Prescribed:  Discharge Medication List as of 11/23/2024 10:14 AM        START taking these medications    Details   lidocaine 5 % External Patch Place 1 patch onto the skin daily for 7 days. Apply to skin for 12 hours at a time, then remove for next 12 hours. Do not apply over broken/irritated skin., Print, Disp-7 patch, R-0      HYDROcodone-acetaminophen 5-325 MG Oral Tab Take 1 tablet by mouth every 8 (eight) hours as needed for Pain., Normal, Disp-9 tablet, R-0      diazePAM 2 MG Oral Tab Take 1 tablet (2 mg total) by mouth every 8 (eight) hours as needed (For pain/spasm)., Normal, Disp-9 tablet, R-0

## 2024-11-23 NOTE — ED INITIAL ASSESSMENT (HPI)
Pt presents from [facility] in [place] for c/o right hip and thigh pain s/p being assisted from bed to chair by NH staff. Pt denies known trauma, and states \"it happened so fast I don't know exactly what happened.\" Pt states \"it wasn't on purpose.\"

## 2024-12-17 ENCOUNTER — APPOINTMENT (OUTPATIENT)
Dept: CT IMAGING | Facility: HOSPITAL | Age: 89
End: 2024-12-17
Attending: EMERGENCY MEDICINE
Payer: MEDICARE

## 2024-12-17 ENCOUNTER — APPOINTMENT (OUTPATIENT)
Dept: GENERAL RADIOLOGY | Facility: HOSPITAL | Age: 89
End: 2024-12-17
Attending: EMERGENCY MEDICINE
Payer: MEDICARE

## 2024-12-17 ENCOUNTER — HOSPITAL ENCOUNTER (INPATIENT)
Facility: HOSPITAL | Age: 89
LOS: 2 days | Discharge: HOME | End: 2024-12-21
Attending: EMERGENCY MEDICINE | Admitting: HOSPITALIST
Payer: MEDICARE

## 2024-12-17 DIAGNOSIS — T07.XXXA MULTIPLE CONTUSIONS: ICD-10-CM

## 2024-12-17 DIAGNOSIS — M48.062 NEUROGENIC CLAUDICATION DUE TO LUMBAR SPINAL STENOSIS: ICD-10-CM

## 2024-12-17 DIAGNOSIS — S09.90XA INJURY OF HEAD, INITIAL ENCOUNTER: ICD-10-CM
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
## 2024-12-17 DIAGNOSIS — M54.16 LUMBAR RADICULOPATHY: ICD-10-CM

## 2024-12-17 LAB — GLUCOSE BLDC GLUCOMTR-MCNC: 105 MG/DL (ref 70–99)

## 2024-12-17 PROCEDURE — 72100 X-RAY EXAM L-S SPINE 2/3 VWS: CPT | Performed by: EMERGENCY MEDICINE

## 2024-12-17 PROCEDURE — 73502 X-RAY EXAM HIP UNI 2-3 VIEWS: CPT | Performed by: EMERGENCY MEDICINE

## 2024-12-17 PROCEDURE — 73552 X-RAY EXAM OF FEMUR 2/>: CPT | Performed by: EMERGENCY MEDICINE

## 2024-12-17 PROCEDURE — 72125 CT NECK SPINE W/O DYE: CPT | Performed by: EMERGENCY MEDICINE

## 2024-12-17 PROCEDURE — 70450 CT HEAD/BRAIN W/O DYE: CPT | Performed by: EMERGENCY MEDICINE

## 2024-12-17 PROCEDURE — 72192 CT PELVIS W/O DYE: CPT | Performed by: EMERGENCY MEDICINE

## 2024-12-17 RX ORDER — HYDROCODONE BITARTRATE AND ACETAMINOPHEN 5; 325 MG/1; MG/1
1 TABLET ORAL ONCE
Status: COMPLETED | OUTPATIENT
Start: 2024-12-17 | End: 2024-12-17

## 2024-12-17 RX ORDER — ONDANSETRON 2 MG/ML
4 INJECTION INTRAMUSCULAR; INTRAVENOUS ONCE
Status: COMPLETED | OUTPATIENT
Start: 2024-12-17 | End: 2024-12-17

## 2024-12-17 RX ORDER — DIAZEPAM 10 MG/2ML
2.5 INJECTION, SOLUTION INTRAMUSCULAR; INTRAVENOUS ONCE
Status: COMPLETED | OUTPATIENT
Start: 2024-12-17 | End: 2024-12-17

## 2024-12-17 RX ORDER — KETOROLAC TROMETHAMINE 15 MG/ML
15 INJECTION, SOLUTION INTRAMUSCULAR; INTRAVENOUS ONCE
Status: COMPLETED | OUTPATIENT
Start: 2024-12-17 | End: 2024-12-17

## 2024-12-17 NOTE — ED INITIAL ASSESSMENT (HPI)
Pt reports mechanical fall and fell to the right side. Pt reports hitting back of head. Denies taking blood thinners or loc. Pt c/o dizziness-onset following administration of fentayl pta. Pt c/o \"whole right sided body pain\" pt is tearful.

## 2024-12-17 NOTE — ED QUICK NOTES
ATTEMPTED TO AMBULATE PT TO BR PT UNABLE TO WALK WITH A WALKER. PT C/O RIGHT LEG PAIN. ASSISTED PT BACK INTO BED. MD NOTIFIED.

## 2024-12-17 NOTE — ED PROVIDER NOTES
Patient Seen in: [facility] Emergency Department      History     Chief Complaint   Patient presents with    Fall     Stated Complaint: fall    Subjective:   HPI      89 y/o female from the nursing home who fell today hit her head and is complaining of some head pain as well as some right hip and right thigh pain.  She got fentanyl with EMS and got dizziness and nausea afterwards.  She denied preceding symptoms before the fall.  Mild neck pain.  Reports some tailbone pain.  No chest pain or shortness of breath or abdominal pain.    Objective:     Past Medical History:    HYPOTHYROIDISM    Osteoarthritis    SOB (shortness of breath)    Thyroid disease    Unspecified essential hypertension              Past Surgical History:   Procedure Laterality Date    Cholecystectomy      D & c      Other surgical history  1986    fungus on lung                Social History     Socioeconomic History    Marital status:     Number of children: 4   Occupational History    Occupation: Retired   Tobacco Use    Smoking status: Never    Smokeless tobacco: Never   Vaping Use    Vaping status: Never Used   Substance and Sexual Activity    Alcohol use: No    Drug use: No    Sexual activity: Never   Other Topics Concern    Caffeine Concern No     Comment: Occas tea    Exercise Yes     Comment: Walks    Seat Belt Yes     Social Drivers of Health     Food Insecurity: No Food Insecurity (12/18/2024)    Food Insecurity     Food Insecurity: Never true   Transportation Needs: No Transportation Needs (12/18/2024)    Transportation Needs     Lack of Transportation: No   Housing Stability: Low Risk  (12/18/2024)    Housing Stability     Housing Instability: No                  Physical Exam     ED Triage Vitals [12/17/24 1527]   BP (!) 188/93   Pulse 78   Resp 13   Temp 97.3 °F (36.3 °C)   Temp src Temporal   SpO2 95 %   O2 Device None (Room air)       Current Vitals:   Vital Signs  BP: 132/78  Pulse: 85  Resp: 16  Temp: 99.1 °F  (37.3 °C)  Temp src: Oral  MAP (mmHg): 94    Oxygen Therapy  SpO2: 96 %  O2 Device: None (Room air)        Physical Exam  Constitutional: Oriented to person, place, and time.  Appears well-developed. No distress.   Head: Normocephalic and atraumatic.   Eyes: Conjunctivae are normal. Pupils are equal, round, and reactive to light.   Neck: Normal range of motion. Neck supple.  Mild pain posteriorly.  Pain with palpation of the lateral anterior right hip.  No gross deformity.  Pain with palpation of the mid to more proximal anterior right thigh.  Cardiovascular: Normal rate, regular rhythm and intact distal pulses.    Pulmonary/Chest: Effort normal. No respiratory distress.   Abdominal: Soft. There is no tenderness. There is no guarding.   Musculoskeletal: No knee or lower leg pain.  Neurological: Alert and oriented to person, place, and time.   Skin: Skin is warm and dry.   Psychiatric: Normal mood and affect.  Behavior is normal.   Nursing note and vitals reviewed.    Differential diagnosis includes multifactorial fall, head injury and concussion, ICH, hip or thigh contusion, less likely fracture.      ED Course     Labs Reviewed   BASIC METABOLIC PANEL (8) - Abnormal; Notable for the following components:       Result Value    Glucose 123 (*)     BUN 29 (*)     BUN/CREA Ratio 33.7 (*)     All other components within normal limits   BASIC METABOLIC PANEL (8) - Abnormal; Notable for the following components:    BUN 24 (*)     BUN/CREA Ratio 34.3 (*)     All other components within normal limits   POCT GLUCOSE - Abnormal; Notable for the following components:    POC Glucose  105 (*)     All other components within normal limits   CBC WITH DIFFERENTIAL WITH PLATELET   RAINBOW DRAW LAVENDER   RAINBOW DRAW LIGHT GREEN   RAINBOW DRAW GOLD   RAINBOW DRAW BLUE     EKG    Rate, intervals and axes as noted on EKG Report.  Rate: 77  Rhythm: Sinus Rhythm  Reading: No acute ischemic changes                    XR LUMBAR SPINE (MIN  2 VIEWS) (CPT=72100)    Result Date: 12/17/2024  PROCEDURE: XR LUMBAR SPINE (MIN 2 VIEWS) (CPT=72100)  COMPARISON: [facility], CT PELVIS (CPT=72192), 12/17/2024, 6:29 PM.  INDICATIONS: Low back pain post fall today.  TECHNIQUE: Lumbar spine radiographs (2-3 views)   FINDINGS:   ALIGNMENT: Levoscoliosis.  Grade 1 degenerative spondylolisthesis of L4 on L 5 and L5 on S1. VERTEBRAL BODIES: Moderate to severe L2 fracture with post kyphoplasty changes.  Lumbar vertebral bodies are otherwise intact. DISC SPACES: Multilevel degenerative disc disease/spondylosis and lower lumbar facet arthrosis most pronounced at L4-5 and L5-S1. PREVERTEBRAL SOFT TISSUES: Negative.  OTHER: Advanced right hip osteoarthritis.  Atherosclerotic vascular calcification.         CONCLUSION:  1. No acute finding. 2. Moderate to severe L2 vertebral compression fracture with post kyphoplasty changes.    Dictated by (CST): Ravi Sage MD on 12/17/2024 at 8:21 PM     Finalized by (CST): Ravi Sage MD on 12/17/2024 at 8:24 PM          CT PELVIS (CPT=72192)    Result Date: 12/17/2024  PROCEDURE: CT PELVIS (CPT=72192)  COMPARISON: [facility], CT BRAIN OR HEAD (CPT=70450), 12/17/2024, 4:02 PM.  INDICATIONS: fall, ongoing R hip/pelvic pain and can't walk  TECHNIQUE:   CT images were obtained without intravenous contrast.  Automated exposure control for dose reduction was used. Adjustment of the mA and/or kV was done based on the patient's size. Use of iterative reconstruction technique for dose reduction was used.  Dose information is transmitted to the ACR (American College of Radiology) NRDR (National Radiology Data Registry) which includes the Dose Index Registry.   FINDINGS:  Bone mineralization is diminished.  There is no acute fracture/dislocation.  There are moderate to severe degenerative changes within both hips (right worse than left).  These are manifested by bony hypertrophy, articular space narrowing, and  subarticular sclerosis.  Additionally, there are calcifications within the right gluteal  muscle tendon at the insertion site on the greater trochanter compatible with gluteal calcific tendinitis.  There are moderate to severe degenerative changes in the lower lumbar spine.  There are moderate atherosclerotic calcifications within a nonaneurysmal lower abdominal aorta and iliac arteries.         CONCLUSION:   1. No acute fracture/dislocation.  2. Calcification along the right greater luteal tendon insertion site compatible with gluteal calcific tendinitis.  3. Moderate to severe degenerative changes within both hips (right worse than left)     Dictated by (CST): Jesus Manuel Pickard MD on 12/17/2024 at 6:45 PM     Finalized by (CST): Jesus Manuel Pickard MD on 12/17/2024 at 6:51 PM          XR FEMUR MIN 2 VIEWS RIGHT (CPT=73552)    Result Date: 12/17/2024  PROCEDURE: XR FEMUR MIN 2 VIEWS RIGHT (CPT=73552)  COMPARISON: [facility], XR FEMUR MIN 2 VIEWS RIGHT (CPT=73552), 11/23/2024, 7:56 AM.  INDICATIONS: Right hip pain post fall today.  TECHNIQUE: 2 views were obtained.   FINDINGS:  BONES: No acute appearing fracture or dislocation.  Moderately severe superior joint space narrowing of osteoarthritis of the right hip.  Moderate degenerative narrowing of the medial and lateral joint spaces of the right knee. SOFT TISSUES: Negative. No visible soft tissue swelling. EFFUSION: None visible. OTHER: Negative.         CONCLUSION:  1. No acute appearing fracture or dislocation.  See above.    Dictated by (CST): Alex Norton MD on 12/17/2024 at 4:35 PM     Finalized by (CST): Alex Norton MD on 12/17/2024 at 4:35 PM          CT SPINE CERVICAL (CPT=72125)    Result Date: 12/17/2024  PROCEDURE: CT SPINE CERVICAL (CPT=72125)  COMPARISON: None.  INDICATIONS: Trauma, neck pain fall  TECHNIQUE:   Multi-planar CT images were obtained without intravenous contrast material.  Automated exposure control for dose reduction was  used. Adjustment of the mA and/or kV was done based on the patient's size. Use of iterative reconstruction technique for dose reduction was used.  Dose information is transmitted to the ACR (American College of Radiology) NRDR (National Radiology Data Registry) which includes the Dose Index Registry.   FINDINGS:  CRANIOCERVICAL AREA:   Normal foramen magnum with no Chiari malformation. PARASPINAL AREA: Normal with no visible mass.  BONES: No acute fracture or subluxation.  Vertebral bodies are intact.  Multilevel degenerative disc disease/spondylosis including moderate to advanced C5-6 degenerative disc disease.  Advanced left C4-5 facet arthrosis and moderate to advanced bilateral  C7-T1 facet arthrosis.  Other levels with less pronounced facet degeneration.  Minimal degenerative retrolisthesis of C5 on C6.  Mild-to-moderate asymmetric central narrowing at C5-6.  Severe right C6 foraminal narrowing.  No additional high-grade stenosis.  OTHER:   Atherosclerotic vascular calcification. Empty sella turcica (typically asymptomatic normal variant).  Biapical pleural parenchymal scarring.          CONCLUSION:  1. No acute fracture or subluxation.    Dictated by (CST): Ravi Sage MD on 12/17/2024 at 4:31 PM     Finalized by (CST): Ravi Sage MD on 12/17/2024 at 4:35 PM          XR HIP W OR WO PELVIS 2 OR 3 VIEWS, RIGHT (CPT=73502)    Result Date: 12/17/2024  PROCEDURE: XR HIP W OR WO PELVIS 2 OR 3 VIEWS, RIGHT (CPT=73502)  COMPARISON: [facility], XR HIP W OR WO PELVIS 2 OR 3 VIEWS, RIGHT (CPT=73502), 11/23/2024, 7:56 AM.  INDICATIONS: Right hip pain post fall today.  TECHNIQUE: 3 views were obtained.   FINDINGS:  BONES: No acute appearing fracture or dislocation.  Intact bony pelvis.  Severe superior joint space narrowing of the right hip with subarticular sclerosis and moderate bony proliferative change.  Moderate central narrowing of the left hip joint space.  Heterotopic bone about the right  greater trochanter and to a lesser degree about the left greater trochanter. SOFT TISSUES: Negative. No visible soft tissue swelling. EFFUSION: None visible. OTHER: Negative.         CONCLUSION:  1. No acute appearing fracture or dislocation.  Moderately advanced osteoarthritis of the right hip as described above.  Intact bony pelvis.    Dictated by (CST): Alex Norton MD on 12/17/2024 at 4:33 PM     Finalized by (CST): Alex Norton MD on 12/17/2024 at 4:34 PM          CT BRAIN OR HEAD (CPT=70450)    Result Date: 12/17/2024  PROCEDURE: CT BRAIN OR HEAD (CPT=70450)  COMPARISON: None.  INDICATIONS: Loss of balance resulting in a fall.   TECHNIQUE: CT images were obtained without contrast material.  Automated exposure control for dose reduction was used.  Dose information is transmitted to the ACR (American College of Radiology) NRDR (National Radiology Data Registry) which includes the Dose Index Registry.  FINDINGS:  CSF SPACES: Prominence of ventricles and sulci..  No hydrocephalus, subarachnoid hemorrhage, or mass.  No midline shift.  CEREBRUM: Mild decreased attenuation in cerebral white matter.  No edema, hemorrhage, mass or acute infarct. CEREBELLUM: No edema, hemorrhage, mass or acute infarct. BRAINSTEM: No edema, hemorrhage, mass or acute infarct. CSF SPACES: No hydrocephalus, subarachnoid hemorrhage, or mass.  SKULL: Skull base and calvarium are intact. SINUSES: Mild chronic inflammatory disease of maxillary and ethmoid sinuses. ORBITS: Limited views are unremarkable.   OTHER:      Atherosclerotic calcification left vertebral artery and bilateral cavernous carotid arteries. Empty sella turcica (typically asymptomatic normal variant).         CONCLUSION:  1. No acute intracranial finding. 2. Age related changes in the brain.     Dictated by (CST): Ravi Sage MD on 12/17/2024 at 4:22 PM     Finalized by (CST): Ravi Sage MD on 12/17/2024 at 4:25 PM               [facility]          Admission disposition:  12/17/2024  7:43 PM           Medical Decision Making  Patient stable here but with ongoing pain.  No obvious evidence of significant injury seen on imaging.  Patient endorsed to Dr. Arora to follow-up and dispo patient if she is able to walk if not she will need to go home with pain management and possible physical therapy.    Problems Addressed:  Accident due to mechanical fall without injury, initial encounter: acute illness or injury  Injury of head, initial encounter: acute illness or injury  Multiple contusions: self-limited or minor problem    Amount and/or Complexity of Data Reviewed  Radiology: ordered and independent interpretation performed. Decision-making details documented in ED Course.     Details: By my review of the noncontrast head CT, there is no obvious evidence of intracranial bleeding, intracranial mass or midline shift.    Risk  Parenteral controlled substances.  Decision regarding hospitalization.        Disposition and Plan     Clinical Impression:  1. Accident due to mechanical fall without injury, initial encounter    2. Injury of head, initial encounter    3. Multiple contusions    4. Neurogenic claudication due to lumbar spinal stenosis    5. Lumbar radiculopathy         Disposition:  Admit  12/17/2024  7:43 pm    Follow-up:  YOUR PCP    Schedule an appointment as soon as possible for a visit  As needed    We recommend that you schedule follow up care with a primary care provider within the next three months to obtain basic health screening including reassessment of your blood pressure.      Medications Prescribed:  Discharge Medication List as of 12/21/2024  5:02 PM              Supplementary Documentation:         Hospital Problems       Present on Admission  Date Reviewed: 6/24/2024            ICD-10-CM Noted POA    * (Principal) Accident due to mechanical fall without injury, initial encounter W19.XXXA 12/17/2024 Unknown    Injury of head, initial encounter S09.90XA 12/18/2024  Unknown    Lumbar radiculopathy M54.16 12/18/2024 Unknown    Multiple contusions T07.XXXA 12/18/2024 Unknown    Neurogenic claudication due to lumbar spinal stenosis M48.062 12/18/2024 Unknown

## 2024-12-18 ENCOUNTER — APPOINTMENT (OUTPATIENT)
Dept: MRI IMAGING | Facility: HOSPITAL | Age: 89
End: 2024-12-18
Attending: HOSPITALIST
Payer: MEDICARE

## 2024-12-18 PROBLEM — S09.90XA INJURY OF HEAD, INITIAL ENCOUNTER: Status: ACTIVE | Noted: 2024-12-18

## 2024-12-18 PROBLEM — M48.062 NEUROGENIC CLAUDICATION DUE TO LUMBAR SPINAL STENOSIS: Status: ACTIVE | Noted: 2024-12-18

## 2024-12-18 PROBLEM — M54.16 LUMBAR RADICULOPATHY: Status: ACTIVE | Noted: 2024-12-18

## 2024-12-18 PROBLEM — T07.XXXA MULTIPLE CONTUSIONS: Status: ACTIVE | Noted: 2024-12-18

## 2024-12-18 LAB
ATRIAL RATE: 77 BPM
P AXIS: 57 DEGREES
P-R INTERVAL: 178 MS
Q-T INTERVAL: 416 MS
QRS DURATION: 72 MS
QTC CALCULATION (BEZET): 470 MS
R AXIS: 3 DEGREES
T AXIS: 68 DEGREES
VENTRICULAR RATE: 77 BPM

## 2024-12-18 PROCEDURE — 72148 MRI LUMBAR SPINE W/O DYE: CPT | Performed by: HOSPITALIST

## 2024-12-18 RX ORDER — LOSARTAN POTASSIUM 100 MG/1
100 TABLET ORAL DAILY
Status: DISCONTINUED | OUTPATIENT
Start: 2024-12-19 | End: 2024-12-21

## 2024-12-18 RX ORDER — HYDROCODONE BITARTRATE AND ACETAMINOPHEN 5; 325 MG/1; MG/1
1 TABLET ORAL EVERY 4 HOURS PRN
Status: DISCONTINUED | OUTPATIENT
Start: 2024-12-18 | End: 2024-12-19

## 2024-12-18 RX ORDER — MORPHINE SULFATE 2 MG/ML
2 INJECTION, SOLUTION INTRAMUSCULAR; INTRAVENOUS EVERY 2 HOUR PRN
Status: DISCONTINUED | OUTPATIENT
Start: 2024-12-18 | End: 2024-12-21

## 2024-12-18 RX ORDER — SENNA AND DOCUSATE SODIUM 50; 8.6 MG/1; MG/1
1 TABLET, FILM COATED ORAL 2 TIMES DAILY
Status: DISCONTINUED | OUTPATIENT
Start: 2024-12-18 | End: 2024-12-21

## 2024-12-18 RX ORDER — HYDRALAZINE HYDROCHLORIDE 20 MG/ML
10 INJECTION INTRAMUSCULAR; INTRAVENOUS EVERY 6 HOURS PRN
Status: DISCONTINUED | OUTPATIENT
Start: 2024-12-18 | End: 2024-12-21

## 2024-12-18 RX ORDER — ONDANSETRON 2 MG/ML
4 INJECTION INTRAMUSCULAR; INTRAVENOUS EVERY 6 HOURS PRN
Status: DISCONTINUED | OUTPATIENT
Start: 2024-12-18 | End: 2024-12-21

## 2024-12-18 RX ORDER — ACETAMINOPHEN 325 MG/1
650 TABLET ORAL EVERY 4 HOURS PRN
Status: DISCONTINUED | OUTPATIENT
Start: 2024-12-18 | End: 2024-12-19

## 2024-12-18 RX ORDER — ACETAMINOPHEN 500 MG
500 TABLET ORAL EVERY 4 HOURS PRN
Status: DISCONTINUED | OUTPATIENT
Start: 2024-12-18 | End: 2024-12-21

## 2024-12-18 RX ORDER — METHYLPREDNISOLONE SODIUM SUCCINATE 40 MG/ML
40 INJECTION INTRAMUSCULAR; INTRAVENOUS EVERY 12 HOURS
Status: DISCONTINUED | OUTPATIENT
Start: 2024-12-18 | End: 2024-12-19

## 2024-12-18 RX ORDER — METOCLOPRAMIDE HYDROCHLORIDE 5 MG/ML
10 INJECTION INTRAMUSCULAR; INTRAVENOUS EVERY 8 HOURS PRN
Status: DISCONTINUED | OUTPATIENT
Start: 2024-12-18 | End: 2024-12-21

## 2024-12-18 RX ORDER — HYDRALAZINE HYDROCHLORIDE 10 MG/1
10 TABLET, FILM COATED ORAL EVERY 6 HOURS PRN
COMMUNITY
End: 2024-12-21

## 2024-12-18 RX ORDER — AMLODIPINE BESYLATE 2.5 MG/1
2.5 TABLET ORAL 2 TIMES DAILY
Status: DISCONTINUED | OUTPATIENT
Start: 2024-12-18 | End: 2024-12-21

## 2024-12-18 RX ORDER — GABAPENTIN 300 MG/1
300 CAPSULE ORAL 3 TIMES DAILY
Status: DISCONTINUED | OUTPATIENT
Start: 2024-12-18 | End: 2024-12-21

## 2024-12-18 RX ORDER — AMOXICILLIN 250 MG
1 CAPSULE ORAL 2 TIMES DAILY
COMMUNITY

## 2024-12-18 RX ORDER — TRAMADOL HYDROCHLORIDE 50 MG/1
50 TABLET ORAL EVERY 8 HOURS PRN
Status: DISCONTINUED | OUTPATIENT
Start: 2024-12-18 | End: 2024-12-19

## 2024-12-18 RX ORDER — CHOLECALCIFEROL (VITAMIN D3) 25 MCG
1000 TABLET ORAL
Status: DISCONTINUED | OUTPATIENT
Start: 2024-12-19 | End: 2024-12-21

## 2024-12-18 RX ORDER — MECLIZINE HCL 12.5 MG 12.5 MG/1
25 TABLET ORAL DAILY PRN
Status: DISCONTINUED | OUTPATIENT
Start: 2024-12-18 | End: 2024-12-21

## 2024-12-18 RX ORDER — LEVOTHYROXINE SODIUM 50 UG/1
50 TABLET ORAL
Status: DISCONTINUED | OUTPATIENT
Start: 2024-12-19 | End: 2024-12-21

## 2024-12-18 RX ORDER — MELOXICAM 15 MG/1
15 TABLET ORAL DAILY
COMMUNITY
End: 2024-12-21

## 2024-12-18 RX ORDER — TRAMADOL HYDROCHLORIDE 50 MG/1
50 TABLET ORAL EVERY 8 HOURS PRN
COMMUNITY
End: 2024-12-21

## 2024-12-18 RX ORDER — HYDROCODONE BITARTRATE AND ACETAMINOPHEN 5; 325 MG/1; MG/1
2 TABLET ORAL EVERY 4 HOURS PRN
Status: DISCONTINUED | OUTPATIENT
Start: 2024-12-18 | End: 2024-12-19

## 2024-12-18 RX ORDER — MORPHINE SULFATE 2 MG/ML
1 INJECTION, SOLUTION INTRAMUSCULAR; INTRAVENOUS EVERY 2 HOUR PRN
Status: DISCONTINUED | OUTPATIENT
Start: 2024-12-18 | End: 2024-12-21

## 2024-12-18 RX ORDER — MORPHINE SULFATE 4 MG/ML
4 INJECTION, SOLUTION INTRAMUSCULAR; INTRAVENOUS EVERY 2 HOUR PRN
Status: DISCONTINUED | OUTPATIENT
Start: 2024-12-18 | End: 2024-12-21

## 2024-12-18 NOTE — PHYSICAL THERAPY NOTE
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  She fell in October 2024, sustained L2 compression fracture requiring kyphoplasty procedure, history of osteoporosis, hypothyroidism, hypertension, generalized osteoarthritis, and mild dementia.  Reason for Therapy: Mobility Dysfunction and Discharge Planning    PHYSICAL THERAPY ASSESSMENT   Patient is a 89 year old female admitted 12/17/2024 for RLE pain.  Prior to admission, patient's baseline is ambulatory with a rollator.  Patient is currently functioning below baseline with bed mobility, transfers, gait, stair negotiation, maintaining seated position, standing prolonged periods, and performing household tasks.  Patient is requiring maximum assist as a result of the following impairments: decreased functional strength, decreased endurance/aerobic capacity, pain, impaired standing balance, decreased muscular endurance, and medical status.  Physical Therapy will continue to follow for duration of hospitalization.    Patient will benefit from continued skilled PT Services to promote return to prior level of function and safety with continuous assistance and gradual rehabilitative therapy .    PLAN DURING HOSPITALIZATION  Nursing Mobility Recommendation : 1 Assist (2 for safety for SPT)     PT Treatment Plan: Bed mobility;Body mechanics;Coordination;Endurance;Patient education;Gait training;Transfer training;Balance training;Strengthening  Rehab Potential : Fair  Frequency (Obs): 3-5x/week     PHYSICAL THERAPY MEDICAL/SOCIAL HISTORY   History related to current admission: The patient is an 89-year-old [place] female who was outside in a shopping store when she sustained a  fall and could not get up. The fall was backward, landed on her buttocks and back. Brought into the emergency department for evaluation. CT scan of the pelvis showed no acute fracture, moderate to severe degenerative changes within both hips. X-ray of the right femur showed no acute fracture. CT scan of the cervical spine and CT scan of the brain showed no acute findings. Because of her inability to ambulate and her intractable pain, she will be admitted to the hospital for further management.      Problem List  Principal Problem:    Accident due to mechanical fall without injury, initial encounter  Active Problems:    Injury of head, initial encounter    Multiple contusions    Neurogenic claudication due to lumbar spinal stenosis    Lumbar radiculopathy    HOME SITUATION  Type of Home: House  Lives With: Spouse    Drives: No   Patient Regularly Uses: Rollator     Prior Level of Kay: Prior to admission patient was ambulatory with a rollator.  assisted with cooking/cleaning. Patient emotional throughout session, unable to confirm levels/stairs in home, will have to confirm at follow up.    SUBJECTIVE  \"It would be nice to have some help at home\" (patient tearful during session)    PHYSICAL THERAPY EXAMINATION   OBJECTIVE  Precautions: Bed/chair alarm  Fall Risk: High fall risk    PAIN ASSESSMENT  Rating: Unable to rate (reports as severe)  Location: RLE  Management Techniques: Activity promotion;Relaxation;Repositioning;Breathing techniques;Body mechanics    COGNITION  Overall Cognitive Status:  WFL - within functional limits  Arousal/Alertness:  delayed responses to stimuli  Safety Judgement:  decreased awareness of need for safety    RANGE OF MOTION AND STRENGTH ASSESSMENT  Upper extremity ROM and strength are within functional limits BUE.  Lower extremity ROM is within functional limits.  Lower extremity strength is limited due to pain.    BALANCE  Static Sitting: Fair  Dynamic Sitting:  Poor  Static Standing: Poor  Dynamic Standing: Dependent    AM-PAC '6-Clicks' INPATIENT SHORT FORM - BASIC MOBILITY  How much difficulty does the patient currently have...  Patient Difficulty: Turning over in bed (including adjusting bedclothes, sheets and blankets)?: A Lot   Patient Difficulty: Sitting down on and standing up from a chair with arms (e.g., wheelchair, bedside commode, etc.): A Lot   Patient Difficulty: Moving from lying on back to sitting on the side of the bed?: A Lot   How much help from another person does the patient currently need...   Help from Another: Moving to and from a bed to a chair (including a wheelchair)?: A Lot   Help from Another: Need to walk in hospital room?: Total   Help from Another: Climbing 3-5 steps with a railing?: Total     AM-PAC Score:  Raw Score: 10   Approx Degree of Impairment: 76.75%   Standardized Score (AM-PAC Scale): 32.29   CMS Modifier (G-Code): CL    FUNCTIONAL ABILITY STATUS  Functional Mobility/Gait Assessment  Gait Assistance: Not tested (due to poor standing tolerance)  Rolling: minimal assist  Supine to Sit: moderate assist  Sit to Supine:  not tested  Sit to Stand: moderate assist  Bed to chair: maximal assistance    Exercise/Education Provided:  Education Provided To: Patient Patient Education: Role of Physical Therapy;Plan of Care;Discharge Recommendations;DME Recommendations;Functional Transfer Techniques;Fall Prevention;Surgical Precautions;Weight Bear Status;Energy Conservation;Gait Training Patient's Response to Education: Requires Further Education     Skilled Therapy Provided: Patient received supine in bed at initiation of session agreeable to participation in PT. Patient tolerates treatment fairly, requires extensive 1 person assistance for supine to sit and bed to chair transfer. Unable to initiate ambulation due to severity of pain. Patient left in bedside chair, lines intact, needs in reach and handoff to RN.    The patient's Approx Degree of  Impairment: 76.75% has been calculated based on documentation in the [facility] '6 clicks' Inpatient Basic Mobility Short Form.  Research supports that patients with this level of impairment may benefit from gradual rehab.  Final disposition will be made by interdisciplinary medical team.    Patient End of Session: Up in chair;Needs met;Call light within reach;RN aware of session/findings;All patient questions and concerns addressed;Hospital anti-slip socks;Alarm set    CURRENT GOALS  Goals to be met by: 1/1/24  Patient Goal Patient's self-stated goal is: to return home   Goal #1 Patient is able to demonstrate supine - sit EOB @ level: modified independent     Goal #1   Current Status    Goal #2 Patient is able to demonstrate transfers EOB to/from Chair/Wheelchair at assistance level: modified independent with  least restrictive device     Goal #2  Current Status    Goal #3 Patient is able to ambulate >50 feet with assist device:  least restrictive device  at assistance level: modified independent   Goal #3   Current Status    Goal #4 Patient will negotiate 2 stairs/one curb w/ assistive device and supervision   Goal #4   Current Status    Goal #5 Patient to demonstrate independence with home activity/exercise instructions provided to patient in preparation for discharge.   Goal #5   Current Status    Goal #6    Goal #6  Current Status      Patient Evaluation Complexity Level:  History High - 3 or more personal factors and/or co-morbidities   Examination of body systems Moderate - addressing a total of 3 or more elements   Clinical Presentation  Moderate - Evolving   Clinical Decision Making  Moderate Complexity     Therapeutic Activity:  10 minutes    Gianna Gan, PT, DPT

## 2024-12-18 NOTE — ED PROVIDER NOTES
Signed over previous shift to follow-up results of imaging.  Patient has negative CT of the pelvis.  Patient was unable to ambulate in the ED due to significant discomfort in the right leg.  On chart review, patient had MRI of the LS spine completed in September which showed significant L4-L5 stenosis.  I believe patient is having symptoms from neurogenic claudication in the right leg preventing her from ambulating at this time.  Discussed with [facility] and they have excepted patient for admission for further management.  Discussed with pain management service will see for consult tomorrow.  Discussed with patient and she feels she is unable to go home due to the discomfort.  She is unable to ambulate and is agreeable with plan for admission.

## 2024-12-18 NOTE — PLAN OF CARE
Problem: Patient Centered Care  Goal: Patient preferences are identified and integrated in the patient's plan of care  Description: Interventions:  - Provide timely, complete, and accurate information to patient/family  - Incorporate patient and family knowledge, values, beliefs, and cultural backgrounds into the planning and delivery of care  - Encourage patient/family to participate in care and decision-making at the level they choose  - Honor patient and family perspectives and choices  Outcome: Progressing     Problem: SKIN/TISSUE INTEGRITY - ADULT  Goal: Skin integrity remains intact  Description: INTERVENTIONS  - Assess and document risk factors for pressure ulcer development  - Assess and document skin integrity  - Monitor for areas of redness and/or skin breakdown  - Initiate interventions, skin care algorithm/standards of care as needed  Outcome: Progressing     Problem: MUSCULOSKELETAL - ADULT  Goal: Return mobility to safest level of function  Description: INTERVENTIONS:  - Assess patient stability and activity tolerance for standing, transferring and ambulating w/ or w/o assistive devices  - Assist with transfers and ambulation using safe patient handling equipment as needed  - Ensure adequate protection for wounds/incisions during mobilization  - Obtain PT/OT consults as needed  - Advance activity as appropriate  - Communicate ordered activity level and limitations with patient/family  Outcome: Progressing     Problem: Impaired Cognition  Goal: Patient will exhibit improved attention, thought processing and/or memory  Description: Interventions:  - Minimize distractions in the room when full attention is required  - Allow additional time for processing after asking questions or providing instructions  Outcome: Progressing     Problem: PAIN - ADULT  Goal: Verbalizes/displays adequate comfort level or patient's stated pain goal  Description: INTERVENTIONS:  - Encourage pt to monitor pain and request  assistance  - Assess pain using appropriate pain scale  - Administer analgesics based on type and severity of pain and evaluate response  - Implement non-pharmacological measures as appropriate and evaluate response  - Consider cultural and social influences on pain and pain management  - Manage/alleviate anxiety  - Utilize distraction and/or relaxation techniques  - Monitor for opioid side effects  - Notify MD/LIP if interventions unsuccessful or patient reports new pain  - Anticipate increased pain with activity and pre-medicate as appropriate  Outcome: Progressing     Problem: SAFETY ADULT - FALL  Goal: Free from fall injury  Description: INTERVENTIONS:  - Assess pt frequently for physical needs  - Identify cognitive and physical deficits and behaviors that affect risk of falls.  - [place] fall precautions as indicated by assessment.  - Educate pt/family on patient safety including physical limitations  - Instruct pt to call for assistance with activity based on assessment  - Modify environment to reduce risk of injury  - Provide assistive devices as appropriate  - Consider OT/PT consult to assist with strengthening/mobility  - Encourage toileting schedule  Outcome: Progressing     Problem: ANXIETY  Goal: Will report anxiety at manageable levels  Description: INTERVENTIONS:  - Administer medication as ordered  - Teach and rehearse alternative coping skills  - Provide emotional support with 1:1 interaction with staff  Outcome: Progressing     Problem: CONFUSION  Goal: Confusion, delirium, dementia or psychosis is improved or at baseline  Description: INTERVENTIONS:  - Assess for possible contributors to thought disturbance, including medications, impaired vision or hearing, underlying metabolic abnormalities, dehydration, psychiatric diagnoses, and notify attending MD/LIP  - [place] high risk fall precautions, as indicated  - Provide frequent short contacts to provide reality reorientation, refocusing and  direction  - Decrease environmental stimuli, including noise as appropriate  - Monitor and intervene to maintain adequate nutrition, hydration, elimination, sleep and activity  - Consider the need for a sitter if unable to leave patient unattended  - Initiate Spiritual Care consult as indicated  - Consult Pharmacy as needed  Outcome: Progressing

## 2024-12-18 NOTE — ED QUICK NOTES
Orders for admission, patient is aware of plan and ready to go upstairs. Any questions, please call ED RN Kathryn at extension [phone].     Patient Covid vaccination status: Fully vaccinated     COVID Test Ordered in ED: None    COVID Suspicion at Admission: N/A    Running Infusions:  None    Mental Status/LOC at time of transport: A&O x3, forgetful     Other pertinent information:   CIWA score: N/A   NIH score:  N/A

## 2024-12-18 NOTE — H&P
[facility]    PATIENT'S NAME: NIRU BLANK   ATTENDING PHYSICIAN: Juan A Coy MD   PATIENT ACCOUNT#:   [id number]    LOCATION:  [address]  MEDICAL RECORD #:   [id number]       YOB: 1935  ADMISSION DATE:       12/17/2024    HISTORY AND PHYSICAL EXAMINATION    CHIEF COMPLAINT:  Intractable lumbar radiculopathy, mechanical fall with inability to ambulate.    HISTORY OF PRESENT ILLNESS:  The patient is an 89-year-old [place] female who was outside in a shopping store when she sustained a fall and could not get up.  The fall was backward, landed on her buttocks and back.  Brought into the emergency department for evaluation.  CT scan of the pelvis showed no acute fracture, moderate to severe degenerative changes within both hips.  X-ray of the right femur showed no acute fracture.  CT scan of the cervical spine and CT scan of the brain showed no acute findings.  Because of her inability to ambulate and her intractable pain, she will be admitted to the hospital for further management.    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  She fell in October 2024, sustained L2 compression fracture requiring kyphoplasty procedure, history of osteoporosis, hypothyroidism, hypertension, generalized osteoarthritis, and mild dementia.    PAST SURGICAL HISTORY:  As mentioned above.  Also had cholecystectomy and remote D and C procedure.    MEDICATIONS:  Please see medication reconciliation list.      ALLERGIES:  Sulfa.    FAMILY HISTORY:  Mother had diabetes mellitus type 2 and heart disease.    SOCIAL HISTORY:  No tobacco, alcohol, or drug use.  Lives with her family.  At baseline independent for basic activities of daily living, uses a walker.    REVIEW OF SYSTEMS:  Patient reports the fall was mechanical.  She fell backward, landed on her buttocks and lumbar area.  Pain has been  intractable before and after the fall, radiating from her lumbar area to right buttock and lateral right thigh.  She denies any loss of consciousness.  Other 12-point review of systems is negative.       PHYSICAL EXAMINATION:    GENERAL:  Alert and oriented to time, place, and person.  Moderate distress.  VITAL SIGNS:  Temperature 97.3, pulse 82, respiratory rate 17, blood pressure 145/87, pulse ox 95% on room air.    HEENT:  Atraumatic.  Oropharynx clear.  Moist mucous membranes.  Ears, nose normal.  Eyes:  Anicteric sclerae.   NECK:  Supple.  No lymphadenopathy.  Trachea midline.  Full range of motion.  LUNGS:  Clear to auscultation bilaterally.  Normal respiratory effort.    HEART:  Regular rate, rhythm.  S1 and S2 auscultated.  No murmur.  ABDOMEN:  Soft, nondistended.  No tenderness.  Positive bowel sounds.    EXTREMITIES:  No peripheral edema, clubbing, or cyanosis.  NEUROLOGIC:  Motor and sensory intact.  Straight leg raising reproduces pain in the right lumbar area and right lateral thigh.    ASSESSMENT AND PLAN:  Intractable lumbar radiculopathy with known underlying severe degenerative joint disease of lumbar spine.  X-ray of the lumbar spine obtained in the emergency room still pending to rule out any new compression fractures.  Will obtain MRI scan of the lumbar area repeat and obtain pain service consult.  Fall precautions.  Physical therapy.  IV Solu-Medrol.  Pain control.  Further recommendations to follow.     Dictated By Mariah Isidro MD  d: 12/17/2024 19:52:42  t: 12/17/2024 21:50:56  Job [id number]  FB/

## 2024-12-19 ENCOUNTER — APPOINTMENT (OUTPATIENT)
Dept: GENERAL RADIOLOGY | Facility: HOSPITAL | Age: 89
End: 2024-12-19
Attending: ANESTHESIOLOGY
Payer: MEDICARE

## 2024-12-19 LAB
ANION GAP SERPL CALC-SCNC: 8 MMOL/L (ref 0–18)
BASOPHILS # BLD AUTO: 0.01 X10(3) UL (ref 0–0.2)
BASOPHILS NFR BLD AUTO: 0.2 %
BUN BLD-MCNC: 29 MG/DL (ref 9–23)
BUN/CREAT SERPL: 33.7 (ref 10–20)
CALCIUM BLD-MCNC: 9.9 MG/DL (ref 8.7–10.4)
CHLORIDE SERPL-SCNC: 101 MMOL/L (ref 98–112)
CO2 SERPL-SCNC: 29 MMOL/L (ref 21–32)
CREAT BLD-MCNC: 0.86 MG/DL
DEPRECATED RDW RBC AUTO: 45.7 FL (ref 35.1–46.3)
EGFRCR SERPLBLD CKD-EPI 2021: 65 ML/MIN/1.73M2 (ref 60–?)
EOSINOPHIL # BLD AUTO: 0 X10(3) UL (ref 0–0.7)
EOSINOPHIL NFR BLD AUTO: 0 %
ERYTHROCYTE [DISTWIDTH] IN BLOOD BY AUTOMATED COUNT: 13.2 % (ref 11–15)
GLUCOSE BLD-MCNC: 123 MG/DL (ref 70–99)
HCT VFR BLD AUTO: 42.1 %
HGB BLD-MCNC: 14.1 G/DL
IMM GRANULOCYTES # BLD AUTO: 0.02 X10(3) UL (ref 0–1)
IMM GRANULOCYTES NFR BLD: 0.3 %
LYMPHOCYTES # BLD AUTO: 1 X10(3) UL (ref 1–4)
LYMPHOCYTES NFR BLD AUTO: 16.5 %
MCH RBC QN AUTO: 31.1 PG (ref 26–34)
MCHC RBC AUTO-ENTMCNC: 33.5 G/DL (ref 31–37)
MCV RBC AUTO: 92.7 FL
MONOCYTES # BLD AUTO: 0.22 X10(3) UL (ref 0.1–1)
MONOCYTES NFR BLD AUTO: 3.6 %
NEUTROPHILS # BLD AUTO: 4.81 X10 (3) UL (ref 1.5–7.7)
NEUTROPHILS # BLD AUTO: 4.81 X10(3) UL (ref 1.5–7.7)
NEUTROPHILS NFR BLD AUTO: 79.4 %
OSMOLALITY SERPL CALC.SUM OF ELEC: 293 MOSM/KG (ref 275–295)
PLATELET # BLD AUTO: 303 10(3)UL (ref 150–450)
POTASSIUM SERPL-SCNC: 4 MMOL/L (ref 3.5–5.1)
RBC # BLD AUTO: 4.54 X10(6)UL
SODIUM SERPL-SCNC: 138 MMOL/L (ref 136–145)
WBC # BLD AUTO: 6.1 X10(3) UL (ref 4–11)

## 2024-12-19 PROCEDURE — 3E0R33Z INTRODUCTION OF ANTI-INFLAMMATORY INTO SPINAL CANAL, PERCUTANEOUS APPROACH: ICD-10-PCS | Performed by: ANESTHESIOLOGY

## 2024-12-19 RX ORDER — METHYLPREDNISOLONE ACETATE 80 MG/ML
INJECTION, SUSPENSION INTRA-ARTICULAR; INTRALESIONAL; INTRAMUSCULAR; SOFT TISSUE AS NEEDED
Status: DISCONTINUED | OUTPATIENT
Start: 2024-12-19 | End: 2024-12-19 | Stop reason: HOSPADM

## 2024-12-19 RX ORDER — LIDOCAINE HYDROCHLORIDE 10 MG/ML
INJECTION, SOLUTION EPIDURAL; INFILTRATION; INTRACAUDAL; PERINEURAL AS NEEDED
Status: DISCONTINUED | OUTPATIENT
Start: 2024-12-19 | End: 2024-12-19 | Stop reason: HOSPADM

## 2024-12-19 RX ORDER — IOPAMIDOL 408 MG/ML
INJECTION, SOLUTION INTRATHECAL AS NEEDED
Status: DISCONTINUED | OUTPATIENT
Start: 2024-12-19 | End: 2024-12-19 | Stop reason: HOSPADM

## 2024-12-19 RX ORDER — ACETAMINOPHEN 325 MG/1
650 TABLET ORAL EVERY 4 HOURS PRN
Status: DISCONTINUED | OUTPATIENT
Start: 2024-12-19 | End: 2024-12-21

## 2024-12-19 RX ORDER — HYDROCODONE BITARTRATE AND ACETAMINOPHEN 5; 325 MG/1; MG/1
1 TABLET ORAL EVERY 6 HOURS PRN
Status: DISCONTINUED | OUTPATIENT
Start: 2024-12-19 | End: 2024-12-21

## 2024-12-19 RX ORDER — SODIUM CHLORIDE 9 MG/ML
INJECTION, SOLUTION INTRAMUSCULAR; INTRAVENOUS; SUBCUTANEOUS AS NEEDED
Status: DISCONTINUED | OUTPATIENT
Start: 2024-12-19 | End: 2024-12-19 | Stop reason: HOSPADM

## 2024-12-19 NOTE — CHRONIC PAIN
[facility]  Report of Consultation    Rebeca Clarke Patient Status:  Inpatient    1935 MRN [id number]   Location [facility] 5SW/SE Attending Kalyani Truong MD   Hosp Day # 0 PCP AIDAN JESUS MD     Date of Admission:  2024  Date of Consult:  2024    Reason for Consultation/Chief Complaint:  Right lower extremity pain     History of Present Illness:  Rebeca Clarke is a a(n) 89 year old female who presented at the ED after sustaining a  fall on her back and buttocks  while shopping, and subsequently developed intractable right leg pain preventing ambulation.   MRI lumbar spine from  revealed L4-5 canal stenosis.   The patient reports pain located in the lower back with radiation into bilateral LE to above the knee level.   The patient is currently on norco 5/325mg prn, morphine IV (none today),  methylprednisolone 40mg q12hrs, gabapentin 300mg TID, lidocaine patch. Patient states these do not help too much. Reports level of pain 7-8/10,     History:  Past Medical History:    HYPOTHYROIDISM    Osteoarthritis    SOB (shortness of breath)    Thyroid disease    Unspecified essential hypertension     Past Surgical History:   Procedure Laterality Date    Cholecystectomy      D & c      Other surgical history      fungus on lung     Family History   Problem Relation Age of Onset    Diabetes Mother     Arthritis Mother     Heart Disorder Mother     Diabetes Maternal Grandmother       reports that she has never smoked. She has never used smokeless tobacco. She reports that she does not drink alcohol and does not use drugs.    Allergies:  Sulfa antibiotics     Medications:    Current Facility-Administered Medications:     acetaminophen (Tylenol Extra Strength) tab 500 mg, 500 mg, Oral, Q4H PRN    ondansetron (Zofran) 4 MG/2ML injection 4 mg, 4 mg, Intravenous, Q6H PRN    metoclopramide (Reglan) 5 mg/mL injection 10 mg, 10 mg, Intravenous, Q8H PRN    morphINE PF  2 MG/ML injection 1 mg, 1 mg, Intravenous, Q2H PRN **OR** morphINE PF 2 MG/ML injection 2 mg, 2 mg, Intravenous, Q2H PRN **OR** morphINE PF 4 MG/ML injection 4 mg, 4 mg, Intravenous, Q2H PRN    acetaminophen (Tylenol) tab 650 mg, 650 mg, Oral, Q4H PRN **OR** HYDROcodone-acetaminophen (Norco) 5-325 MG per tab 1 tablet, 1 tablet, Oral, Q4H PRN **OR** HYDROcodone-acetaminophen (Norco) 5-325 MG per tab 2 tablet, 2 tablet, Oral, Q4H PRN    methylPREDNISolone sodium succinate (Solu-MEDROL) injection 40 mg, 40 mg, Intravenous, Q12H    hydrALAzine (Apresoline) 20 mg/mL injection 10 mg, 10 mg, Intravenous, Q6H PRN    amLODIPine (Norvasc) tab 2.5 mg, 2.5 mg, Oral, BID    cholecalciferol (Vitamin D3) tab 1,000 Units, 1,000 Units/day, Oral, Daily with food    gabapentin (Neurontin) cap 300 mg, 300 mg, Oral, TID    levothyroxine (Synthroid) tab 50 mcg, 50 mcg, Oral, Daily @ 0700    lidocaine-menthol 4-1 % patch 1 patch, 1 patch, Transdermal, Daily    losartan (Cozaar) tab 100 mg, 100 mg, Oral, Daily    meclizine (Antivert) tab 25 mg, 25 mg, Oral, Daily PRN    senna-docusate (Senokot-S) 8.6-50 MG per tab 1 tablet, 1 tablet, Oral, BID    sertraline (Zoloft) tab 50 mg, 50 mg, Oral, Daily    traMADol (Ultram) tab 50 mg, 50 mg, Oral, Q8H PRN    Review of Systems:  Pertinent items are noted in HPI.    Physical Exam:  Blood pressure (!) 156/91, pulse 72, temperature 98.1 °F (36.7 °C), temperature source Oral, resp. rate 18, height 5' 3\" (1.6 m), weight 115 lb 11.9 oz (52.5 kg), SpO2 94%, not currently breastfeeding.    General: Alert and oriented x3, NAD, appears stated age, appropriate disposition and demeanor, answers questions appropriately   Head: normocephalic, atraumatic  Eyes: anicteric; no injection  Neck: supple, trachea midline, no obvious JVD  Respiratory: breathing non labored   SLR: positive for radicular leg pain b/l     Laboratory Data:  Lab Results   Component Value Date    WBC 6.1 12/19/2024    HGB 14.1 12/19/2024     HCT 42.1 12/19/2024    .0 12/19/2024    CREATSERUM 0.86 12/19/2024    BUN 29 12/19/2024     12/19/2024    K 4.0 12/19/2024     12/19/2024    CO2 29.0 12/19/2024     12/19/2024    CA 9.9 12/19/2024       Imaging:  PROCEDURE: MRI SPINE LUMBAR (CPT=72148)4     COMPARISON: None.     INDICATIONS: intractable pain, radiculopathy right lower ext     TECHNIQUE: A variety of imaging planes and parameters were utilized for visualization of suspected pathology.     FINDINGS:  PARASPINAL AREA: Normal with no visible mass.  Incidental hepatic cyst.    BONES: A moderate acute or subacute L2 vertebral compression fracture with posterior inferior retropulsion resulting in mild central narrowing.  Levoscoliosis.  CORD/CAUDA EQUINA: Normal caliber, contour, and signal intensity.    OTHER: Multiple perineural nerve root sleeve cysts including a large left T10 perineural nerve root sleeve cyst measuring 3.2 cm and a right T11 cyst measuring 1.9 cm.  Additional perineural nerve root sleeve cysts bilaterally T10, T11, T12, left L1,  left L2, right L5 and a large chronic sacral Tarlov cyst with chronic pressure erosion of the S2 vertebral segment.     LUMBAR DISC LEVELS:  L1-L2: Minimal degenerative retrolisthesis.  Decrease in disc height with a spondylotic disc bulge.  Mild-to-moderate right and mild left foraminal narrowing.  No significant central narrowing.  L2-L3: Compression fracture associated with mild central narrowing, severe right and mild left foraminal narrowing.  Mild facet arthrosis.  L3-L4: Minimal degenerative retrolisthesis.  Decrease in disc height with a spondylotic disc bulging and minimal degenerative retrolisthesis.  Moderate right and mild left facet arthrosis with ligamentum flavum hypertrophy.  Mild central narrowing.    Mild-to-moderate right and mild left foraminal narrowing.  L4-L5: Decrease in disc height with a spondylotic disc bulge.  Grade 1 degenerative spondylolisthesis.   Moderate central and lateral narrowing.  Severe right and mild left foraminal narrowing.  L5-S1: Advanced left and moderate right facet arthrosis.  Partial collapse of the disc with spondylotic disc bulge and disc osteophyte complex extending into the left foramen.  Minimal degenerative anterolisthesis.  Moderate left and mild right foraminal   narrowing.  No significant central narrowing.                  Impression   CONCLUSION:  1. Moderate acute or subacute L2 vertebral compression fracture with posterior inferior retropulsion associated with mild central narrowing and severe right foraminal narrowing.  2. Multilevel disc and facet degeneration with most significant findings at L4-5.  3. L4-5:  Grade 1 degenerative spondylolisthesis.  Moderate central and lateral narrowing.  Severe right foraminal narrowing.  4. 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           Dictated by (CST): Ravi Sage MD on 12/18/2024 at 5:10 PM      Finalized by (CST): Ravi Sage MD on 12/18/2024 at 5:19 PM        Impression:  Patient Active Problem List   Diagnosis    Menopause    Prolapse of uterus    Lipoma of axilla    Hypertension    SOB (shortness of breath)    Encounter for fitting and adjustment of pessary    Encounter for annual routine gynecological examination    Accident due to mechanical fall without injury, initial encounter    Injury of head, initial encounter    Multiple contusions    Neurogenic claudication due to lumbar spinal stenosis    Lumbar radiculopathy       Intractable low back pain with radiation into bilateral LE to the knee level  2/2 severe neuroforaminal narrowing b/l at L4-5 and acute compression fracture at L2   Patient has positive Straight Leg Raising Test bilaterally, indicating the pain most likely related to nerve root irritation   Vertebral compression fracture considered the secondary source of the pain   No improvement with IV steroids and pain  medications     Recommendations:  Given radicular leg pain b/l recommend first attempting Lumbar Interlaminar Epidural Steroid Injection at L4-5 level with Local anesthesia     Consider kyphoplasty for vertebral compression fracture     Discontinue IV methylprednisolone   Continue norco 5/325mg q6hrs prn   Morphine IV for BT pain   Continue tylenol 650mg prn   Lidocaine patch   Cont gabapentin 300mg TID   Discontinue tramadol       Thank you for allowing me to participate in the care of your patient.        Comprehensive analgesic plan was formulated. Conservative vs. Aggressive measures were discussed at length including pharmacotherapy (eg. Anti- inflammatories, muscle relaxants, neuropathic medications, oral steroids, analgesics), injections, and further testing. Risks and benefits of all options were discussed at length to patients satisfaction during a comprehensive interactive discussion. All questions were answered during extended questions and answer session. Patient agreeable to discussion plan. Greater than 50% of the time was spent with counseling (nature of discussion centered around pain, therapy, and treatment options), face to face time, time spent reviewing data, obtaining patient information and discussing the care with the patients health care providers.       Time spent: 25 minutes       Kamilla Kevin DO  Anesthesiology  Pain Medicine

## 2024-12-19 NOTE — PLAN OF CARE
Problem: Patient Centered Care  Goal: Patient preferences are identified and integrated in the patient's plan of care  Description: Interventions:  - What would you like us to know as we care for you?   - Provide timely, complete, and accurate information to patient/family  - Incorporate patient and family knowledge, values, beliefs, and cultural backgrounds into the planning and delivery of care  - Encourage patient/family to participate in care and decision-making at the level they choose  - Honor patient and family perspectives and choices  Outcome: Progressing     Problem: Patient/Family Goals  Goal: Patient/Family Long Term Goal  Description: Patient's Long Term Goal: Safe & Free from falls.    Interventions:  - Fall risk precautions are followed at all times.  - Non-skid socks are worn at all times.  - Bed and chair alarm in use at all times.  - Call light within reach at all times.  - See additional Care Plan goals for specific interventions  Outcome: Progressing  Goal: Patient/Family Short Term Goal  Description: Patient's Short Term Goal: Pain relief.    Interventions:   - Pain level is assess using pain scale from 1 to 10.  - Medicated as needed for pain or discomfort.  - See additional Care Plan goals for specific interventions  Outcome: Progressing     Problem: SKIN/TISSUE INTEGRITY - ADULT  Goal: Skin integrity remains intact  Description: INTERVENTIONS  - Assess and document risk factors for pressure ulcer development  - Assess and document skin integrity  - Monitor for areas of redness and/or skin breakdown  - Initiate interventions, skin care algorithm/standards of care as needed  Outcome: Progressing     Problem: MUSCULOSKELETAL - ADULT  Goal: Return mobility to safest level of function  Description: INTERVENTIONS:  - Assess patient stability and activity tolerance for standing, transferring and ambulating w/ or w/o assistive devices  - Assist with transfers and ambulation using safe patient  handling equipment as needed  - Ensure adequate protection for wounds/incisions during mobilization  - Obtain PT/OT consults as needed  - Advance activity as appropriate  - Communicate ordered activity level and limitations with patient/family  Outcome: Progressing     Problem: Impaired Cognition  Goal: Patient will exhibit improved attention, thought processing and/or memory  Description: Interventions:  - Allow additional time for processing after asking questions or providing instructions  - Provide stimulation to the neglected side by encouraging family to sit/stand on the inattentive side during conversation  - For patients with neglect, place hot drinks on the unaffected side  Outcome: Progressing     Problem: PAIN - ADULT  Goal: Verbalizes/displays adequate comfort level or patient's stated pain goal  Description: INTERVENTIONS:  - Encourage pt to monitor pain and request assistance  - Assess pain using appropriate pain scale  - Administer analgesics based on type and severity of pain and evaluate response  - Implement non-pharmacological measures as appropriate and evaluate response  - Consider cultural and social influences on pain and pain management  - Manage/alleviate anxiety  - Utilize distraction and/or relaxation techniques  - Monitor for opioid side effects  - Notify MD/LIP if interventions unsuccessful or patient reports new pain  - Anticipate increased pain with activity and pre-medicate as appropriate  Outcome: Progressing     Problem: SAFETY ADULT - FALL  Goal: Free from fall injury  Description: INTERVENTIONS:  - Assess pt frequently for physical needs  - Identify cognitive and physical deficits and behaviors that affect risk of falls.  - [place] fall precautions as indicated by assessment.  - Educate pt/family on patient safety including physical limitations  - Instruct pt to call for assistance with activity based on assessment  - Modify environment to reduce risk of injury  - Provide  assistive devices as appropriate  - Consider OT/PT consult to assist with strengthening/mobility  - Encourage toileting schedule  Outcome: Progressing     Problem: ANXIETY  Goal: Will report anxiety at manageable levels  Description: INTERVENTIONS:  - Administer medication as ordered  - Teach and rehearse alternative coping skills  - Provide emotional support with 1:1 interaction with staff  Outcome: Progressing     Problem: CONFUSION  Goal: Confusion, delirium, dementia or psychosis is improved or at baseline  Description: INTERVENTIONS:  - Assess for possible contributors to thought disturbance, including medications, impaired vision or hearing, underlying metabolic abnormalities, dehydration, psychiatric diagnoses, and notify attending MD/LIP  - [place] high risk fall precautions, as indicated  - Provide frequent short contacts to provide reality reorientation, refocusing and direction  - Decrease environmental stimuli, including noise as appropriate  - Monitor and intervene to maintain adequate nutrition, hydration, elimination, sleep and activity  - Consider the need for a sitter if unable to leave patient unattended  - Initiate Spiritual Care consult as indicated  - Consult Pharmacy as needed  Outcome: Progressing     Patient is presently resting in the bed. Alert x 3. Vital signs taken and stable. Fall risk precautions are followed at all times. Patient is medicated as needed for pain or discomfort. Tolerates diet well. Bed and chair alarm in use at all times. Call light within reach at all times. Purewick in place. Prompt care is given to incontinence. Kept clean and dry.

## 2024-12-19 NOTE — PROGRESS NOTES
[facility]  part of [facility]    Progress Note    Rebeca Clarke Patient Status:  Observation    1935 MRN [id number]   Location [facility] 5SW/SE Attending Kalyani Truong MD   Hosp Day # 0 PCP AIDAN JESUS MD       Subjective:   Rebeca Clarke is a(n) 89 year old female who is feeling better. Afebrile. Pain is still present in the right thigh.     Objective:   Blood pressure (!) 174/89, pulse 86, temperature 98.5 °F (36.9 °C), temperature source Oral, resp. rate 18, height 5' 3\" (1.6 m), weight 115 lb 11.9 oz (52.5 kg), SpO2 96%, not currently breastfeeding.    Physical Exam:    General: No acute distress.   Respiratory: Clear to auscultation bilaterally. No wheezes. No rhonchi.  Cardiovascular: S1, S2. Regular rate and rhythm. No murmurs, rubs or gallops.   Abdomen: Soft, nontender, nondistended.  Positive bowel sounds. No rebound or guarding.  Neurologic: No focal neurological deficits.   Musculoskeletal: Moves all extremities.  Extremities: No edema.    Results:     Lab Results   Component Value Date    TSH 1.430 2024    B12 644 2024       MRI SPINE LUMBAR (CPT=72148)    Result Date: 2024  CONCLUSION:  1. Moderate acute or subacute L2 vertebral compression fracture with posterior inferior retropulsion associated with mild central narrowing and severe right foraminal narrowing. 2. Multilevel disc and facet degeneration with most significant findings at L4-5. 3. L4-5:  Grade 1 degenerative spondylolisthesis.  Moderate central and lateral narrowing.  Severe right foraminal narrowing. 4. 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    Dictated by (CST): Ravi Sage MD on 2024 at 5:10 PM     Finalized by (CST): Ravi Sage MD on 2024 at 5:19 PM          XR LUMBAR SPINE (MIN 2 VIEWS) (CPT=72100)    Result Date: 2024  CONCLUSION:  1. No acute finding. 2. Moderate to severe L2 vertebral compression  fracture with post kyphoplasty changes.    Dictated by (CST): Ravi Sage MD on 12/17/2024 at 8:21 PM     Finalized by (CST): Ravi Sage MD on 12/17/2024 at 8:24 PM          CT PELVIS (CPT=72192)    Result Date: 12/17/2024  CONCLUSION:   1. No acute fracture/dislocation.  2. Calcification along the right greater luteal tendon insertion site compatible with gluteal calcific tendinitis.  3. Moderate to severe degenerative changes within both hips (right worse than left)     Dictated by (CST): Jesus Manuel Pickard MD on 12/17/2024 at 6:45 PM     Finalized by (CST): Jesus Manuel Pickard MD on 12/17/2024 at 6:51 PM          XR FEMUR MIN 2 VIEWS RIGHT (CPT=73552)    Result Date: 12/17/2024  CONCLUSION:  1. No acute appearing fracture or dislocation.  See above.    Dictated by (CST): Alex Norton MD on 12/17/2024 at 4:35 PM     Finalized by (CST): Alex Norton MD on 12/17/2024 at 4:35 PM          CT SPINE CERVICAL (CPT=72125)    Result Date: 12/17/2024  CONCLUSION:  1. No acute fracture or subluxation.    Dictated by (CST): Ravi Sage MD on 12/17/2024 at 4:31 PM     Finalized by (CST): Ravi Sage MD on 12/17/2024 at 4:35 PM          XR HIP W OR WO PELVIS 2 OR 3 VIEWS, RIGHT (CPT=73502)    Result Date: 12/17/2024  CONCLUSION:  1. No acute appearing fracture or dislocation.  Moderately advanced osteoarthritis of the right hip as described above.  Intact bony pelvis.    Dictated by (CST): Alex Norton MD on 12/17/2024 at 4:33 PM     Finalized by (CST): Alex Norton MD on 12/17/2024 at 4:34 PM          CT BRAIN OR HEAD (CPT=70450)    Result Date: 12/17/2024  CONCLUSION:  1. No acute intracranial finding. 2. Age related changes in the brain.     Dictated by (CST): Ravi Sage MD on 12/17/2024 at 4:22 PM     Finalized by (CST): Ravi Sage MD on 12/17/2024 at 4:25 PM         EKG 12 Lead    Result Date: 12/18/2024  Sinus rhythm with Premature atrial complexes Anterior infarct , age undetermined Abnormal ECG  No previous ECGs found in [facility] Confirmed by BREANN ESTRADA ([id number]) on 12/18/2024 6:55:53 AM      methylPREDNISolone  40 mg Intravenous Q12H    amLODIPine  2.5 mg Oral BID    [START ON 12/19/2024] cholecalciferol  1,000 Units/day Oral Daily with food    gabapentin  300 mg Oral TID    [START ON 12/19/2024] levothyroxine  50 mcg Oral Daily @ 0700    lidocaine-menthol  1 patch Transdermal Daily    [START ON 12/19/2024] losartan  100 mg Oral Daily    senna-docusate  1 tablet Oral BID    [START ON 12/19/2024] sertraline  50 mg Oral Daily       acetaminophen    ondansetron    metoclopramide    morphINE **OR** morphINE **OR** morphINE    acetaminophen **OR** HYDROcodone-acetaminophen **OR** HYDROcodone-acetaminophen    hydrALAzine    meclizine    traMADol      Assessment and Plan:     Accident due to mechanical fall without injury, initial encounter  Pain control with pain meds  MRI pending      HTN   Continue amlodipine and gabapentin     Hypothyroidism  Continue levothryroxine    MAjor depression  Continue home meds     DVT proph - SCDs    Full     MDM .high      TRISTA ROUSE MD  12/18/24

## 2024-12-19 NOTE — CM/SW NOTE
12/19/24 1200   CM/SW Referral Data   Referral Source Physician   Reason for Referral Discharge planning   Informant Patient;EMR;Clinical Staff Member;Other   Medical Hx   Does patient have an established PCP? Yes  (Dr. Samia Bynum)   Patient Info   Advanced directives? Yes   Patient's Current Mental Status at Time of Assessment Alert;Oriented   Patient's Home Environment Assisted Living   Post Acute Care Provider Upon Admission   ([facility])   Number of Levels in Home 1   Patient lives with Spouse/Significant other   Patient Status Prior to Admission   Independent with ADLs and Mobility No   Pt. requires assistance with Driving;Meals;Ambulating   Services in place prior to admission DME/Supplies at home   Type of DME/Supplies Rollator Walker;Shower Chair;Grab Bars   Discharge Needs   Anticipated D/C needs Home health care;Subacute rehab;To be determined   Services Requested   Submitted to [facility] Yes   PASRR Level 1 Submitted Yes   PMR Consult Requested Not clinically appropriate at this time     SW received MD order for discharge planning.    Pt admitted after fall due to intractable back pain.  Pt has MRI which showed compression fractures- IR consulted.    JAN met with pt bedside to complete assessment.    Pt tearful throughout assessment but very pleasant.    SW confirmed with pt she is staying at [place] at [facility] with her .  Pt confirmed PCP on file and saw her in office 12/11.    Pt was a [place] from 10/26-11/22.  She decided to move into [facility] for increased support after rehab.  Pt's baseline is ambulatory w/ a walker.  Pt requires min assist with ADLs.    Pt stated she does have HCPOA completed- unable to locate on Care Everywhere.     Anticipated therapy need: Gradual Rehabilitative Therapy.    [facility] submitted.  [facility] referrals sent.  PASRR complete and uploaded to referral.    JAN to wait for IR consult to speak to pt's daughter (as pt requested) to see if intervention is needed.    JAN  spoke to KELLY ngo/[facility] [phone] and gave him updates.    PLAN: TBD pending clinical course    / to remain available for support and/or discharge planning.      Karen Zarate MSW, LSW [id number]

## 2024-12-19 NOTE — PLAN OF CARE
Problem: Patient Centered Care  Goal: Patient preferences are identified and integrated in the patient's plan of care  Description: Interventions:  - Provide timely, complete, and accurate information to patient/family  - Incorporate patient and family knowledge, values, beliefs, and cultural backgrounds into the planning and delivery of care  - Encourage patient/family to participate in care and decision-making at the level they choose  - Honor patient and family perspectives and choices  Outcome: Progressing     Problem: SKIN/TISSUE INTEGRITY - ADULT  Goal: Skin integrity remains intact  Description: INTERVENTIONS  - Assess and document risk factors for pressure ulcer development  - Assess and document skin integrity  - Monitor for areas of redness and/or skin breakdown  - Initiate interventions, skin care algorithm/standards of care as needed  Outcome: Progressing     Problem: MUSCULOSKELETAL - ADULT  Goal: Return mobility to safest level of function  Description: INTERVENTIONS:  - Assess patient stability and activity tolerance for standing, transferring and ambulating w/ or w/o assistive devices  - Assist with transfers and ambulation using safe patient handling equipment as needed  - Ensure adequate protection for wounds/incisions during mobilization  - Obtain PT/OT consults as needed  - Advance activity as appropriate  - Communicate ordered activity level and limitations with patient/family  Outcome: Progressing     Problem: Impaired Cognition  Goal: Patient will exhibit improved attention, thought processing and/or memory  Description: Interventions:  - Minimize distractions in the room when full attention is required  - Allow additional time for processing after asking questions or providing instructions  Outcome: Progressing     Problem: PAIN - ADULT  Goal: Verbalizes/displays adequate comfort level or patient's stated pain goal  Description: INTERVENTIONS:  - Encourage pt to monitor pain and request  assistance  - Assess pain using appropriate pain scale  - Administer analgesics based on type and severity of pain and evaluate response  - Implement non-pharmacological measures as appropriate and evaluate response  - Consider cultural and social influences on pain and pain management  - Manage/alleviate anxiety  - Utilize distraction and/or relaxation techniques  - Monitor for opioid side effects  - Notify MD/LIP if interventions unsuccessful or patient reports new pain  - Anticipate increased pain with activity and pre-medicate as appropriate  Outcome: Progressing     Problem: SAFETY ADULT - FALL  Goal: Free from fall injury  Description: INTERVENTIONS:  - Assess pt frequently for physical needs  - Identify cognitive and physical deficits and behaviors that affect risk of falls.  - [place] fall precautions as indicated by assessment.  - Educate pt/family on patient safety including physical limitations  - Instruct pt to call for assistance with activity based on assessment  - Modify environment to reduce risk of injury  - Provide assistive devices as appropriate  - Consider OT/PT consult to assist with strengthening/mobility  - Encourage toileting schedule  Outcome: Progressing     Problem: ANXIETY  Goal: Will report anxiety at manageable levels  Description: INTERVENTIONS:  - Administer medication as ordered  - Teach and rehearse alternative coping skills  - Provide emotional support with 1:1 interaction with staff  Outcome: Progressing     Problem: CONFUSION  Goal: Confusion, delirium, dementia or psychosis is improved or at baseline  Description: INTERVENTIONS:  - Assess for possible contributors to thought disturbance, including medications, impaired vision or hearing, underlying metabolic abnormalities, dehydration, psychiatric diagnoses, and notify attending MD/LIP  - [place] high risk fall precautions, as indicated  - Provide frequent short contacts to provide reality reorientation, refocusing and  direction  - Decrease environmental stimuli, including noise as appropriate  - Monitor and intervene to maintain adequate nutrition, hydration, elimination, sleep and activity  - Consider the need for a sitter if unable to leave patient unattended  - Initiate Spiritual Care consult as indicated  - Consult Pharmacy as needed  Outcome: Progressing

## 2024-12-19 NOTE — PLAN OF CARE
General diet today, NPO at midnight,  IR consult for compression fracture.    Problem: Patient Centered Care  Goal: Patient preferences are identified and integrated in the patient's plan of care  Description: Interventions:  - What would you like us to know as we care for you? From [facility]  - Provide timely, complete, and accurate information to patient/family  - Incorporate patient and family knowledge, values, beliefs, and cultural backgrounds into the planning and delivery of care  - Encourage patient/family to participate in care and decision-making at the level they choose  - Honor patient and family perspectives and choices  Outcome: Progressing     Problem: SKIN/TISSUE INTEGRITY - ADULT  Goal: Skin integrity remains intact  Description: INTERVENTIONS  - Assess and document risk factors for pressure ulcer development  - Assess and document skin integrity  - Monitor for areas of redness and/or skin breakdown  - Initiate interventions, skin care algorithm/standards of care as needed  Outcome: Progressing     Problem: Impaired Cognition  Goal: Patient will exhibit improved attention, thought processing and/or memory  Description: Interventions:  - Minimize distractions in the room when full attention is required  Outcome: Progressing     Problem: PAIN - ADULT  Goal: Verbalizes/displays adequate comfort level or patient's stated pain goal  Description: INTERVENTIONS:  - Encourage pt to monitor pain and request assistance  - Assess pain using appropriate pain scale  - Administer analgesics based on type and severity of pain and evaluate response  - Implement non-pharmacological measures as appropriate and evaluate response  - Consider cultural and social influences on pain and pain management  - Manage/alleviate anxiety  - Utilize distraction and/or relaxation techniques  - Monitor for opioid side effects  - Notify MD/LIP if interventions unsuccessful or patient reports new pain  - Anticipate increased pain  with activity and pre-medicate as appropriate  Outcome: Progressing     Problem: SAFETY ADULT - FALL  Goal: Free from fall injury  Description: INTERVENTIONS:  - Assess pt frequently for physical needs  - Identify cognitive and physical deficits and behaviors that affect risk of falls.  - [place] fall precautions as indicated by assessment.  - Educate pt/family on patient safety including physical limitations  - Instruct pt to call for assistance with activity based on assessment  - Modify environment to reduce risk of injury  - Provide assistive devices as appropriate  - Consider OT/PT consult to assist with strengthening/mobility  - Encourage toileting schedule  Outcome: Progressing     Problem: ANXIETY  Goal: Will report anxiety at manageable levels  Description: INTERVENTIONS:  - Administer medication as ordered  - Teach and rehearse alternative coping skills  - Provide emotional support with 1:1 interaction with staff  Outcome: Progressing     Problem: CONFUSION  Goal: Confusion, delirium, dementia or psychosis is improved or at baseline  Description: INTERVENTIONS:  - Assess for possible contributors to thought disturbance, including medications, impaired vision or hearing, underlying metabolic abnormalities, dehydration, psychiatric diagnoses, and notify attending MD/LIP  - [place] high risk fall precautions, as indicated  - Provide frequent short contacts to provide reality reorientation, refocusing and direction  - Decrease environmental stimuli, including noise as appropriate  - Monitor and intervene to maintain adequate nutrition, hydration, elimination, sleep and activity  - Consider the need for a sitter if unable to leave patient unattended  - Initiate Spiritual Care consult as indicated  - Consult Pharmacy as needed  Outcome: Progressing     Problem: MUSCULOSKELETAL - ADULT  Goal: Return mobility to safest level of function  Description: INTERVENTIONS:  - Assess patient stability and activity  tolerance for standing, transferring and ambulating w/ or w/o assistive devices  - Assist with transfers and ambulation using safe patient handling equipment as needed  - Ensure adequate protection for wounds/incisions during mobilization  - Obtain PT/OT consults as needed  - Advance activity as appropriate  - Communicate ordered activity level and limitations with patient/family  Outcome: Not Progressing

## 2024-12-20 PROCEDURE — 99231 SBSQ HOSP IP/OBS SF/LOW 25: CPT | Performed by: ANESTHESIOLOGY

## 2024-12-20 RX ORDER — ALPRAZOLAM 0.5 MG
0.5 TABLET ORAL EVERY 6 HOURS PRN
Status: DISCONTINUED | OUTPATIENT
Start: 2024-12-20 | End: 2024-12-21

## 2024-12-20 NOTE — CONGREGATE LIVING REVIEW
[facility] Living Authorization    The [facility] Review Committee has reviewed this case and the patient IS APPROVED for discharge to a facility for Short Term Skilled once the following procedure is followed:     - The physician discharge instructions (contained within the DANA note for SNF) must inlcude the below appropriate and approved COVID instructions to the facility    For questions regarding [facility] approval process, please contact the CM assigned to the case.  For questions regarding RN discharge workflow, please contact the unit Clinical Leader.

## 2024-12-20 NOTE — CHRONIC PAIN
[facility]  Anesthesiology Pain Management Progress Note      Patient name: Rebeca Clarke 89 year old female  : 1935  MRN: [id number]    Reason for Consult: Right thigh pain    Current hospital day: Hospital Day: 4    Pain Scores: 2    Subjective: Patient she feels that she is doing better.  Presently she is on Norco 5/325 every 6 hours as needed and morphine IV as needed.  Also she is on gabapentin 300 mg 3 times per day.  She feels that medicines are working.  But \"she still feels not bad pain \"    History:  Past Medical History:    HYPOTHYROIDISM    Osteoarthritis    SOB (shortness of breath)    Thyroid disease    Unspecified essential hypertension     Past Surgical History:   Procedure Laterality Date    Cholecystectomy      D & c      Other surgical history      fungus on lung     Family History   Problem Relation Age of Onset    Diabetes Mother     Arthritis Mother     Heart Disorder Mother     Diabetes Maternal Grandmother       reports that she has never smoked. She has never used smokeless tobacco. She reports that she does not drink alcohol and does not use drugs.    Allergies:  Allergies[1]    Current Medications:  Scheduled Meds:   amLODIPine  2.5 mg Oral BID    cholecalciferol  1,000 Units/day Oral Daily with food    gabapentin  300 mg Oral TID    levothyroxine  50 mcg Oral Daily @ 0700    lidocaine-menthol  1 patch Transdermal Daily    losartan  100 mg Oral Daily    senna-docusate  1 tablet Oral BID    sertraline  50 mg Oral Daily     Continuous Infusions:  PRN Meds:.  acetaminophen **OR** HYDROcodone-acetaminophen **OR** [DISCONTINUED] HYDROcodone-acetaminophen    acetaminophen    ondansetron    metoclopramide    morphINE **OR** morphINE **OR** morphINE    hydrALAzine    meclizine    PHYSICAL EXAMINATION:  Vitals:    24 0821   BP: 155/78   Pulse: 74   Resp: 17   Temp: 98.6 °F (37 °C)      General: Alert and oriented x3, NAD, appears stated age, appropriate disposition  and demeanor, answers questions appropriately   Head: normocephalic, atraumatic  Eyes: anicteric; no injection  Ears: no obvious deformities noted   Nose: externally grossly within normal limits, no unusual discharge or rhinorrhea   Throat: lips grossly within normal limits by visual exam externally  Neck: supple, trachea midline, no obvious JVD      Assessment:  Pain is controlled with present pain management.    Plan:  Would recommend to continue [place] 5/300 325 every 6 hours as needed, morphine IV as needed, gabapentin 300 mg 3 times per day.  Patient needs physical therapy.  Will sign off.  If you need additional input please put consult into epic.    Conservative vs. Interventional pain treatment options were discussed at length including pharmacotherapy (eg. Anti- inflammatories, muscle relaxants, neuropathic medications, oral steroids, analgesics), injections, and further testing. Risks and benefits of all options were discussed to patients satisfaction. All questions were answered. Patient agreeable to treatment plan. Greater than 50% of the time was spent with counseling (nature of discussion centered around pain, therapy, and treatment options), face to face time, time spent reviewing data, obtaining patient information and discussing the care with the patients health care providers.       Total visit time 19 minutes.    DEVI SWEENEY MD  12/20/2024  Chronic Pain Service [phone]           [1]   Allergies  Allergen Reactions    Metoprolol OTHER (SEE COMMENTS)     bradycardia    Sulfa Antibiotics RASH

## 2024-12-20 NOTE — PHYSICAL THERAPY NOTE
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  She fell in October 2024, sustained L2 compression fracture requiring kyphoplasty procedure, history of osteoporosis, hypothyroidism, hypertension, generalized osteoarthritis, and mild dementia.    Problem List  Principal Problem:    Accident due to mechanical fall without injury, initial encounter  Active Problems:    Injury of head, initial encounter    Multiple contusions    Neurogenic claudication due to lumbar spinal stenosis    Lumbar radiculopathy    PHYSICAL THERAPY ASSESSMENT   Patient demonstrates limited progress this session, goals  remain in progress.      Patient is requiring maximum assist as a result of the following impairments: decreased functional strength, decreased endurance/aerobic capacity, pain, impaired sitting/standing balance, decreased muscular endurance, and medical status.     Patient continues to function below baseline with bed mobility, transfers, gait, stair negotiation, maintaining seated position, standing prolonged periods, and performing household tasks.  Next session anticipate patient to progress bed mobility, transfers, gait, stair negotiation, maintaining seated position, standing prolonged periods, and performing household tasks.  Physical Therapy will continue to follow patient for duration of hospitalization.    Patient continues to benefit from continued skilled PT services: to promote return to prior level of function and safety with continuous assistance and gradual rehabilitative therapy .    PLAN DURING HOSPITALIZATION  Nursing Mobility Recommendation : 1 Assist (2 assist for SPT for safety)     PT Treatment Plan: Bed mobility;Body mechanics;Coordination;Endurance;Energy conservation;Gait  training;Strengthening;Transfer training;Balance training  Frequency (Obs): 3-5x/week     SUBJECTIVE  \"I haven't stood in a while\"    OBJECTIVE  Precautions: Bed/chair alarm    PAIN ASSESSMENT   Rating: Unable to rate  Location: RLE  Management Techniques: Activity promotion;Body mechanics;Breathing techniques;Relaxation;Repositioning    BALANCE  Static Sitting: Fair  Dynamic Sitting: Poor  Static Standing: Poor  Dynamic Standing: Dependent    AM-PAC '6-Clicks' INPATIENT SHORT FORM - BASIC MOBILITY  How much difficulty does the patient currently have...  Patient Difficulty: Turning over in bed (including adjusting bedclothes, sheets and blankets)?: A Lot   Patient Difficulty: Sitting down on and standing up from a chair with arms (e.g., wheelchair, bedside commode, etc.): A Lot   Patient Difficulty: Moving from lying on back to sitting on the side of the bed?: A Lot   How much help from another person does the patient currently need...   Help from Another: Moving to and from a bed to a chair (including a wheelchair)?: A Lot   Help from Another: Need to walk in hospital room?: Total   Help from Another: Climbing 3-5 steps with a railing?: Total     AM-PAC Score:  Raw Score: 10   Approx Degree of Impairment: 76.75%   Standardized Score (AM-PAC Scale): 32.29   CMS Modifier (G-Code): CL    FUNCTIONAL ABILITY STATUS  Functional Mobility/Gait Assessment  Gait Assistance: Not tested (due to poor standing tolerance)  Rolling: moderate assist  Supine to Sit: moderate assist  Sit to Supine:  not tested  Sit to Stand: maximum assist  Bed to chair: maximum assist    Skilled Therapy Provided: Patient received supine in bed at initiation of session agreeable to participation in PT. Patient tolerates treatment fairly, continues to require extensive 1 person assistance for supine to sit and bed to chair transfer. Unable to initiate ambulation due to severity of pain. Patient left in bedside chair, lines intact, needs in reach and  handoff to RN.     The patient's Approx Degree of Impairment: 76.75% has been calculated based on documentation in the [facility] '6 clicks' Inpatient Daily Activity Short Form.  Research supports that patients with this level of impairment may benefit from gradual rehab.  Final disposition will be made by interdisciplinary medical team.    THERAPEUTIC EXERCISES  Lower Extremity Ankle pumps  Knee extension     Position Sitting       Patient End of Session: Up in chair;Needs met;Call light within reach;RN aware of session/findings;All patient questions and concerns addressed;Hospital anti-slip socks;Alarm set    CURRENT GOALS   Goals to be met by: 1/1/24  Patient Goal Patient's self-stated goal is: to return home   Goal #1 Patient is able to demonstrate supine - sit EOB @ level: modified independent      Goal #1   Current Status  Mod A   Goal #2 Patient is able to demonstrate transfers EOB to/from Chair/Wheelchair at assistance level: modified independent with  least restrictive device      Goal #2  Current Status  Max A   Goal #3 Patient is able to ambulate >50 feet with assist device:  least restrictive device  at assistance level: modified independent   Goal #3   Current Status  NT   Goal #4 Patient will negotiate 2 stairs/one curb w/ assistive device and supervision   Goal #4   Current Status  NT   Goal #5 Patient to demonstrate independence with home activity/exercise instructions provided to patient in preparation for discharge.   Goal #5   Current Status  Progressing   Goal #6     Goal #6  Current Status       Therapeutic Activity: 15 minutes    Gianna Gan PT, DPT

## 2024-12-20 NOTE — CM/SW NOTE
SW followed up on discharge planning.    [facility] approved.    Pt seen by Dr. Nye, no kypho recommended.  Pt received LESI shot for pain control.    SW spoke to pt's daughter Eulalia to discuss discharge plan.    Rocco stated pt was set to begin on-site PT/OT at [facility] the days he was admitted to [facility].    Rocco prefers return to [facility] w/on-site PT/OT rather than return to [facility].    JAN sent chat to 5th floor therapy team to see if they are able to re-evaluate.  Per [facility] guidelines pt must be x1 assist to return.    For [facility] benefit, pt will need 1 more MN.    PLAN: DC to [facility] pending list/choice vs. Return to [facility] at [facility] w/on-site therapy pending med clear    / to remain available for support and/or discharge planning.     Karen Zarate MSW, LSW [id number]

## 2024-12-20 NOTE — CONSULTS
[facility]  part of [facility]      Consult     Rebeca Clarke Patient Status:  Inpatient    1935 MRN [id number]   Location [facility] 5SW/SE Attending Kalyani Truong MD   Hosp Day # 0 PCP AIDAN JESUS MD     Referring Provider: Dionne  Reason for Consultation: low back pain    Subjective:    Rebeca Clarke is a 89 year old female with low back pain radiating to the right leg.  Ms. Clarke has suffered several falls lately, the most recent one just prior to admission.  She has severe low back pain radiating down the right leg, worse with any movement. No significant extremity, bowel or bladder  weakness.    MRI was performed, demonstrating a subacute compression fx L2 with posterior retropulsed fragment causing central canal and severe right foraminal narrowing, as well as multilevel disc disease.    Patient has h/o recent kyphoplasty at L2 performed at [facility].       History/Other:      Past Medical History:  Past Medical History:    HYPOTHYROIDISM    Osteoarthritis    SOB (shortness of breath)    Thyroid disease    Unspecified essential hypertension        Past Surgical History:   Past Surgical History:   Procedure Laterality Date    Cholecystectomy      D & c      Other surgical history      fungus on lung       Social History:  reports that she has never smoked. She has never used smokeless tobacco. She reports that she does not drink alcohol and does not use drugs.    Family History:   Family History   Problem Relation Age of Onset    Diabetes Mother     Arthritis Mother     Heart Disorder Mother     Diabetes Maternal Grandmother        Allergies: Allergies[1]    Medications:  Medications Ordered Prior to Encounter[2]    Review of Systems:   Review of systems was completed.  Pertinent positives and negatives noted in the HPI.    Objective:     /88 (BP Location: Left arm)   Pulse 100   Temp 98.3 °F (36.8 °C) (Oral)   Resp 18   Ht 63\"   Wt 115 lb 11.9 oz (52.5  kg)   SpO2 95%   BMI 20.50 kg/m²     General: No acute distress.  Alert ,         Respiratory: No wheezes, no rhonchi, clear to auscultation bilaterally,    Cardiovascular: S1, S2.  Abdomen: Soft, nontender, nondistended.    Extremities: No edema, no cyanosis. + straight leg raise    Results:    Labs:  Laboratory data reviewed.      Selected labs - last 24 hours:  Endo  Lytes  Renal   Glu 123  Na 138 Ca 9.9  BUN 29   POC Gluc  -  K 4.0 PO4 -  Cr 0.86   A1c -  Cl 101 Mg -  eGFR 65   TSH -  CO2 29.0         LFT  CBC  Other   AST -  WBC 6.1  PTT - Procal -   ALT -  Hb 14.1  INR - CRP -   APk -  Hct 42.1  Trop - D dim -   T sheila -  .0  pBNP -  BNP -  - Ferritin  -   Prot -    CK  - Lactate  -   Alb -    LDL  - COVID  -       Imaging: Imaging data reviewed in Epic.    Assessment & Plan:      Back pain with radicular symptoms primarily the result of spinal and right foraminal stenosis at L2.  She has undergone previous kyphoplasty of a compression fx at that level, performed at [facility] 10/25/[facility].  Review of the MRI demonstrates an adequate fill with cement, so no benefit with additional kyphoplasty at that level.  The patient may benefit fro consultation c anesthesia pain service and possible LESI.      Plan of care discussed at length with patient and Dr. Truong.    Te yNe MD  12/19/2024    Supplementary Documentation:                            [1]   Allergies  Allergen Reactions    Metoprolol OTHER (SEE COMMENTS)     bradycardia    Sulfa Antibiotics RASH   [2]   Current Facility-Administered Medications on File Prior to Encounter   Medication Dose Route Frequency Provider Last Rate Last Admin    [COMPLETED] diazePAM (Valium) tab 2.5 mg  2.5 mg Oral Once Sheldon Rai MD   2.5 mg at 11/23/24 0743    [COMPLETED] HYDROcodone-acetaminophen (Norco) 5-325 MG per tab 1 tablet  1 tablet Oral Once Sheldon Rai MD   1 tablet at 11/23/24 0790     Current Outpatient Medications on File Prior to  Encounter   Medication Sig Dispense Refill    sennosides-docusate 8.6-50 MG Oral Tab Take 1 tablet by mouth 2 (two) times daily.      sertraline 50 MG Oral Tab Take 1 tablet (50 mg total) by mouth daily.      traMADol 50 MG Oral Tab Take 1 tablet (50 mg total) by mouth every 8 (eight) hours as needed for Pain.      hydrALAZINE 10 MG Oral Tab Take 1 tablet (10 mg total) by mouth every 6 (six) hours as needed.      amLODIPine 2.5 MG Oral Tab Take 1 tablet (2.5 mg total) by mouth 2 (two) times daily.      losartan 100 MG Oral Tab Take 1 tablet (100 mg total) by mouth daily.      meclizine 25 MG Oral Tab Take 1 tablet (25 mg total) by mouth daily as needed.      Meloxicam 15 MG Oral Tab Take 1 tablet (15 mg total) by mouth daily.      [] lidocaine 5 % External Patch Place 1 patch onto the skin daily for 7 days. Apply to skin for 12 hours at a time, then remove for next 12 hours. Do not apply over broken/irritated skin. 7 patch 0    [] gabapentin 300 MG Oral Cap Take 1 capsule (300 mg total) by mouth 3 (three) times daily for 5 days. 15 capsule 0    NON FORMULARY SUPER C MULTIVITAMIN      Levothyroxine Sodium (SYNTHROID OR) Take 50 mcg/day by mouth daily.      Cholecalciferol (VITAMIN D) 1000 UNITS Oral Cap Take 1,000 Units/day by mouth daily with food.

## 2024-12-20 NOTE — PROGRESS NOTES
[facility]  part of [facility]    Progress Note    Rebeca Clarke Patient Status:  Observation    1935 MRN [id number]   Location [facility] 5SW/SE Attending Kalyani Truong MD   Hosp Day # 1 PCP AIDAN JESUS MD       Subjective:   Rebeca Clarke is a(n) 89 year old female who is doing ok. Afebrile. Pain still present.     Objective:   Blood pressure 155/78, pulse 74, temperature 98.6 °F (37 °C), temperature source Oral, resp. rate 17, height 5' 3\" (1.6 m), weight 115 lb 11.9 oz (52.5 kg), SpO2 94%, not currently breastfeeding.    Physical Exam:    General: No acute distress.   Respiratory: Clear to auscultation bilaterally. No wheezes. No rhonchi.  Cardiovascular: S1, S2. Regular rate and rhythm. No murmurs, rubs or gallops.   Abdomen: Soft, nontender, nondistended.  Positive bowel sounds. No rebound or guarding.  Neurologic: No focal neurological deficits.   Musculoskeletal: Moves all extremities.  Extremities: No edema.    Results:     Lab Results   Component Value Date    WBC 6.1 2024    HGB 14.1 2024    HCT 42.1 2024    .0 2024    CREATSERUM 0.86 2024    BUN 29 (H) 2024     2024    K 4.0 2024     2024    CO2 29.0 2024     (H) 2024    CA 9.9 2024    TSH 1.430 2024    B12 644 2024       XR PAIN CLINIC FLUOROSCOPY - N/C    Result Date: 2024   Fluoroscopy support was provided. A radiologist was not present for the procedure. Images demonstrate limited fluoroscopic imaging from spinal intervention. Please see separate procedural/operative report for details.   Dictated by (CST): Charisma Cavazos MD on 2024 at 5:31 PM     Finalized by (CST): Charisma Cavazos MD on 2024 at 5:32 PM          MRI SPINE LUMBAR (CPT=72148)    Result Date: 2024  CONCLUSION:  1. Moderate acute or subacute L2 vertebral compression fracture with posterior inferior  retropulsion associated with mild central narrowing and severe right foraminal narrowing. 2. Multilevel disc and facet degeneration with most significant findings at L4-5. 3. L4-5:  Grade 1 degenerative spondylolisthesis.  Moderate central and lateral narrowing.  Severe right foraminal narrowing. 4. 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    Dictated by (CST): Ravi Sage MD on 12/18/2024 at 5:10 PM     Finalized by (CST): Ravi Sage MD on 12/18/2024 at 5:19 PM          XR LUMBAR SPINE (MIN 2 VIEWS) (CPT=72100)    Result Date: 12/17/2024  CONCLUSION:  1. No acute finding. 2. Moderate to severe L2 vertebral compression fracture with post kyphoplasty changes.    Dictated by (CST): Ravi Sage MD on 12/17/2024 at 8:21 PM     Finalized by (CST): Ravi Sage MD on 12/17/2024 at 8:24 PM          CT PELVIS (CPT=72192)    Result Date: 12/17/2024  CONCLUSION:   1. No acute fracture/dislocation.  2. Calcification along the right greater luteal tendon insertion site compatible with gluteal calcific tendinitis.  3. Moderate to severe degenerative changes within both hips (right worse than left)     Dictated by (CST): Jesus Manuel Pickard MD on 12/17/2024 at 6:45 PM     Finalized by (CST): Jesus Manuel Pickard MD on 12/17/2024 at 6:51 PM          XR FEMUR MIN 2 VIEWS RIGHT (CPT=73552)    Result Date: 12/17/2024  CONCLUSION:  1. No acute appearing fracture or dislocation.  See above.    Dictated by (CST): Alex Norton MD on 12/17/2024 at 4:35 PM     Finalized by (CST): Alex Norton MD on 12/17/2024 at 4:35 PM          CT SPINE CERVICAL (CPT=72125)    Result Date: 12/17/2024  CONCLUSION:  1. No acute fracture or subluxation.    Dictated by (CST): Ravi Sage MD on 12/17/2024 at 4:31 PM     Finalized by (CST): Ravi Sage MD on 12/17/2024 at 4:35 PM          XR HIP W OR WO PELVIS 2 OR 3 VIEWS, RIGHT (CPT=73502)    Result Date: 12/17/2024  CONCLUSION:  1. No acute appearing  fracture or dislocation.  Moderately advanced osteoarthritis of the right hip as described above.  Intact bony pelvis.    Dictated by (CST): Alex Norton MD on 12/17/2024 at 4:33 PM     Finalized by (CST): Alex Norton MD on 12/17/2024 at 4:34 PM          CT BRAIN OR HEAD (CPT=70450)    Result Date: 12/17/2024  CONCLUSION:  1. No acute intracranial finding. 2. Age related changes in the brain.     Dictated by (CST): Ravi Sage MD on 12/17/2024 at 4:22 PM     Finalized by (CST): Ravi Sage MD on 12/17/2024 at 4:25 PM                amLODIPine  2.5 mg Oral BID    cholecalciferol  1,000 Units/day Oral Daily with food    gabapentin  300 mg Oral TID    levothyroxine  50 mcg Oral Daily @ 0700    lidocaine-menthol  1 patch Transdermal Daily    losartan  100 mg Oral Daily    senna-docusate  1 tablet Oral BID    sertraline  50 mg Oral Daily       acetaminophen **OR** HYDROcodone-acetaminophen **OR** [DISCONTINUED] HYDROcodone-acetaminophen    acetaminophen    ondansetron    metoclopramide    morphINE **OR** morphINE **OR** morphINE    hydrALAzine    meclizine      Assessment and Plan:     Accident due to mechanical fall without injury, initial encounter  Pain control with pain meds  MRI reviewed- lumbar compression fractue  IR consult   Pain consult for possible steroid injection      HTN   Continue amlodipine and gabapentin     Hypothyroidism  Continue levothryroxine    MAjor depression  Continue home meds     DVT proph - SCDs    Full     MDM .high      TRISTA ROUSE MD  12/19/24

## 2024-12-20 NOTE — PLAN OF CARE
Problem: Patient Centered Care  Goal: Patient preferences are identified and integrated in the patient's plan of care  Description: Interventions:  - What would you like us to know as we care for you? From [place] [facility]  - Provide timely, complete, and accurate information to patient/family  - Incorporate patient and family knowledge, values, beliefs, and cultural backgrounds into the planning and delivery of care  - Encourage patient/family to participate in care and decision-making at the level they choose  - Honor patient and family perspectives and choices  Outcome: Progressing     Problem: SKIN/TISSUE INTEGRITY - ADULT  Goal: Skin integrity remains intact  Description: INTERVENTIONS  - Assess and document risk factors for pressure ulcer development  - Assess and document skin integrity  - Monitor for areas of redness and/or skin breakdown  - Initiate interventions, skin care algorithm/standards of care as needed  Outcome: Progressing     Problem: MUSCULOSKELETAL - ADULT  Goal: Return mobility to safest level of function  Description: INTERVENTIONS:  - Assess patient stability and activity tolerance for standing, transferring and ambulating w/ or w/o assistive devices  - Assist with transfers and ambulation using safe patient handling equipment as needed  - Ensure adequate protection for wounds/incisions during mobilization  - Obtain PT/OT consults as needed  - Advance activity as appropriate  - Communicate ordered activity level and limitations with patient/family  Outcome: Progressing     Problem: Impaired Cognition  Goal: Patient will exhibit improved attention, thought processing and/or memory  Description: Interventions:  - Minimize distractions in the room when full attention is required  - Allow additional time for processing after asking questions or providing instructions  Outcome: Progressing     Problem: PAIN - ADULT  Goal: Verbalizes/displays adequate comfort level or patient's stated pain  goal  Description: INTERVENTIONS:  - Encourage pt to monitor pain and request assistance  - Assess pain using appropriate pain scale  - Administer analgesics based on type and severity of pain and evaluate response  - Implement non-pharmacological measures as appropriate and evaluate response  - Consider cultural and social influences on pain and pain management  - Manage/alleviate anxiety  - Utilize distraction and/or relaxation techniques  - Monitor for opioid side effects  - Notify MD/LIP if interventions unsuccessful or patient reports new pain  - Anticipate increased pain with activity and pre-medicate as appropriate  Outcome: Progressing     Problem: SAFETY ADULT - FALL  Goal: Free from fall injury  Description: INTERVENTIONS:  - Assess pt frequently for physical needs  - Identify cognitive and physical deficits and behaviors that affect risk of falls.  - [place] fall precautions as indicated by assessment.  - Educate pt/family on patient safety including physical limitations  - Instruct pt to call for assistance with activity based on assessment  - Modify environment to reduce risk of injury  - Provide assistive devices as appropriate  - Consider OT/PT consult to assist with strengthening/mobility  - Encourage toileting schedule  Outcome: Progressing     Problem: ANXIETY  Goal: Will report anxiety at manageable levels  Description: INTERVENTIONS:  - Administer medication as ordered  - Teach and rehearse alternative coping skills  - Provide emotional support with 1:1 interaction with staff  Outcome: Progressing     Problem: CONFUSION  Goal: Confusion, delirium, dementia or psychosis is improved or at baseline  Description: INTERVENTIONS:  - Assess for possible contributors to thought disturbance, including medications, impaired vision or hearing, underlying metabolic abnormalities, dehydration, psychiatric diagnoses, and notify attending MD/LIP  - [place] high risk fall precautions, as indicated  - Provide  frequent short contacts to provide reality reorientation, refocusing and direction  - Decrease environmental stimuli, including noise as appropriate  - Monitor and intervene to maintain adequate nutrition, hydration, elimination, sleep and activity  - Consider the need for a sitter if unable to leave patient unattended  - Initiate Spiritual Care consult as indicated  - Consult Pharmacy as needed  Outcome: Progressing

## 2024-12-21 VITALS
HEIGHT: 63 IN | DIASTOLIC BLOOD PRESSURE: 78 MMHG | TEMPERATURE: 99 F | SYSTOLIC BLOOD PRESSURE: 132 MMHG | RESPIRATION RATE: 16 BRPM | WEIGHT: 115.75 LBS | HEART RATE: 85 BPM | OXYGEN SATURATION: 96 % | BODY MASS INDEX: 20.51 KG/M2

## 2024-12-21 LAB
ANION GAP SERPL CALC-SCNC: 6 MMOL/L (ref 0–18)
BUN BLD-MCNC: 24 MG/DL (ref 9–23)
BUN/CREAT SERPL: 34.3 (ref 10–20)
CALCIUM BLD-MCNC: 9.4 MG/DL (ref 8.7–10.4)
CHLORIDE SERPL-SCNC: 105 MMOL/L (ref 98–112)
CO2 SERPL-SCNC: 27 MMOL/L (ref 21–32)
CREAT BLD-MCNC: 0.7 MG/DL
EGFRCR SERPLBLD CKD-EPI 2021: 83 ML/MIN/1.73M2 (ref 60–?)
GLUCOSE BLD-MCNC: 94 MG/DL (ref 70–99)
OSMOLALITY SERPL CALC.SUM OF ELEC: 290 MOSM/KG (ref 275–295)
POTASSIUM SERPL-SCNC: 4.1 MMOL/L (ref 3.5–5.1)
SODIUM SERPL-SCNC: 138 MMOL/L (ref 136–145)

## 2024-12-21 RX ORDER — GABAPENTIN 300 MG/1
300 CAPSULE ORAL 3 TIMES DAILY
Qty: 15 CAPSULE | Refills: 0 | Status: SHIPPED | OUTPATIENT
Start: 2024-12-21 | End: 2024-12-26

## 2024-12-21 RX ORDER — LIDOCAINE 50 MG/G
1 PATCH TOPICAL EVERY 24 HOURS
Qty: 7 PATCH | Refills: 0 | Status: SHIPPED | OUTPATIENT
Start: 2024-12-21 | End: 2024-12-28

## 2024-12-21 NOTE — PROGRESS NOTES
[facility]  part of formerly [facility]    Progress Note    Rebeca Clarke Patient Status:  Observation    1935 MRN [id number]   Location [facility] 5SW/SE Attending Kalyani Truong MD   Hosp Day # 2 PCP AIDAN JESUS MD       Subjective:   Rebeca Clarke is a(n) 89 year old female who is anxious and tearful. Eating well.     Objective:   Blood pressure (!) 163/81, pulse 80, temperature 98.2 °F (36.8 °C), temperature source Oral, resp. rate 16, height 5' 3\" (1.6 m), weight 115 lb 11.9 oz (52.5 kg), SpO2 98%, not currently breastfeeding.    Physical Exam:    General: No acute distress.   Respiratory: Clear to auscultation bilaterally. No wheezes. No rhonchi.  Cardiovascular: S1, S2. Regular rate and rhythm. No murmurs, rubs or gallops.   Abdomen: Soft, nontender, nondistended.  Positive bowel sounds. No rebound or guarding.  Neurologic: No focal neurological deficits.   Musculoskeletal: Moves all extremities.  Extremities: No edema.    Results:     Lab Results   Component Value Date    WBC 6.1 2024    HGB 14.1 2024    HCT 42.1 2024    .0 2024    CREATSERUM 0.70 2024    BUN 24 (H) 2024     2024    K 4.1 2024     2024    CO2 27.0 2024    GLU 94 2024    CA 9.4 2024    TSH 1.430 2024    B12 644 2024       XR PAIN CLINIC FLUOROSCOPY - N/C    Result Date: 2024   Fluoroscopy support was provided. A radiologist was not present for the procedure. Images demonstrate limited fluoroscopic imaging from spinal intervention. Please see separate procedural/operative report for details.   Dictated by (CST): Charisma Cavazos MD on 2024 at 5:31 PM     Finalized by (CST): Charisma Cavazos MD on 2024 at 5:32 PM          MRI SPINE LUMBAR (CPT=72148)    Result Date: 2024  CONCLUSION:  1. Moderate acute or subacute L2 vertebral compression fracture with posterior inferior retropulsion  associated with mild central narrowing and severe right foraminal narrowing. 2. Multilevel disc and facet degeneration with most significant findings at L4-5. 3. L4-5:  Grade 1 degenerative spondylolisthesis.  Moderate central and lateral narrowing.  Severe right foraminal narrowing. 4. 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    Dictated by (CST): Ravi Sage MD on 12/18/2024 at 5:10 PM     Finalized by (CST): Ravi Sage MD on 12/18/2024 at 5:19 PM                amLODIPine  2.5 mg Oral BID    cholecalciferol  1,000 Units/day Oral Daily with food    gabapentin  300 mg Oral TID    levothyroxine  50 mcg Oral Daily @ 0700    lidocaine-menthol  1 patch Transdermal Daily    losartan  100 mg Oral Daily    senna-docusate  1 tablet Oral BID    sertraline  50 mg Oral Daily       ALPRAZolam    acetaminophen **OR** HYDROcodone-acetaminophen **OR** [DISCONTINUED] HYDROcodone-acetaminophen    acetaminophen    ondansetron    metoclopramide    morphINE **OR** morphINE **OR** morphINE    hydrALAzine    meclizine      Assessment and Plan:     Accident due to mechanical fall without injury, initial encounter  Pain control with pain meds  MRI reviewed- lumbar compression fractue  IR consult   Pain consult for possible steroid injection  Status post steroid injection  Home hopefully tomorrow    Anxiety  Psych consult  Xanax ordered.       HTN   Continue amlodipine and gabapentin     Hypothyroidism  Continue levothryroxine    MAjor depression  Continue home meds     DVT proph - SCDs    Full     MDM .high      TRISTA ROUSE MD  12/20/24

## 2024-12-21 NOTE — CM/SW NOTE
JAN followed up with daughter Ratsa in regards to discharge update- [facility] vs [facility]. Daughter wishes to continue with [facility] with onsite PT OT. SW informed daughter that PT saw patient yesterday, and informed daughter that patient is mod a x1. Daughter verbalizes understanding and still wishes to proceed to [facility]. Daughter is aware of potential discharge for today.      12/21/24 1100   Discharge disposition   Expected discharge disposition Home or Self   Post Acute Care Provider   ([facility])   Patient Declines Recommended Services Yes  ([facility])   Discharge transportation Private car       JAN/ZELALEM to remain available for support and/or discharge planning.     Sarah Nagy MSW, LSW   x [phone]

## 2024-12-21 NOTE — PLAN OF CARE
Problem: Patient Centered Care  Goal: Patient preferences are identified and integrated in the patient's plan of care  Description: Interventions:  - What would you like us to know as we care for you? From [place] [facility]  - Provide timely, complete, and accurate information to patient/family  - Incorporate patient and family knowledge, values, beliefs, and cultural backgrounds into the planning and delivery of care  - Encourage patient/family to participate in care and decision-making at the level they choose  - Honor patient and family perspectives and choices  Outcome: Progressing     Problem: Patient/Family Goals  Goal: Patient/Family Long Term Goal  Description: Patient's Long Term Goal:     Interventions:  -   - See additional Care Plan goals for specific interventions  Outcome: Progressing  Goal: Patient/Family Short Term Goal  Description: Patient's Short Term Goal:     Interventions:   -   - See additional Care Plan goals for specific interventions  Outcome: Progressing     Problem: SKIN/TISSUE INTEGRITY - ADULT  Goal: Skin integrity remains intact  Description: INTERVENTIONS  - Assess and document risk factors for pressure ulcer development  - Assess and document skin integrity  - Monitor for areas of redness and/or skin breakdown  - Initiate interventions, skin care algorithm/standards of care as needed  Outcome: Progressing     Problem: MUSCULOSKELETAL - ADULT  Goal: Return mobility to safest level of function  Description: INTERVENTIONS:  - Assess patient stability and activity tolerance for standing, transferring and ambulating w/ or w/o assistive devices  - Assist with transfers and ambulation using safe patient handling equipment as needed  - Ensure adequate protection for wounds/incisions during mobilization  - Obtain PT/OT consults as needed  - Advance activity as appropriate  - Communicate ordered activity level and limitations with patient/family  Outcome: Progressing     Problem: Impaired  Cognition  Goal: Patient will exhibit improved attention, thought processing and/or memory  Description: Interventions:    Outcome: Progressing     Problem: PAIN - ADULT  Goal: Verbalizes/displays adequate comfort level or patient's stated pain goal  Description: INTERVENTIONS:  - Encourage pt to monitor pain and request assistance  - Assess pain using appropriate pain scale  - Administer analgesics based on type and severity of pain and evaluate response  - Implement non-pharmacological measures as appropriate and evaluate response  - Consider cultural and social influences on pain and pain management  - Manage/alleviate anxiety  - Utilize distraction and/or relaxation techniques  - Monitor for opioid side effects  - Notify MD/LIP if interventions unsuccessful or patient reports new pain  - Anticipate increased pain with activity and pre-medicate as appropriate  Outcome: Progressing     Problem: SAFETY ADULT - FALL  Goal: Free from fall injury  Description: INTERVENTIONS:  - Assess pt frequently for physical needs  - Identify cognitive and physical deficits and behaviors that affect risk of falls.  - [place] fall precautions as indicated by assessment.  - Educate pt/family on patient safety including physical limitations  - Instruct pt to call for assistance with activity based on assessment  - Modify environment to reduce risk of injury  - Provide assistive devices as appropriate  - Consider OT/PT consult to assist with strengthening/mobility  - Encourage toileting schedule  Outcome: Progressing     Problem: ANXIETY  Goal: Will report anxiety at manageable levels  Description: INTERVENTIONS:  - Administer medication as ordered  - Teach and rehearse alternative coping skills  - Provide emotional support with 1:1 interaction with staff  Outcome: Progressing     Problem: CONFUSION  Goal: Confusion, delirium, dementia or psychosis is improved or at baseline  Description: INTERVENTIONS:  - Assess for possible  contributors to thought disturbance, including medications, impaired vision or hearing, underlying metabolic abnormalities, dehydration, psychiatric diagnoses, and notify attending MD/LIP  - [place] high risk fall precautions, as indicated  - Provide frequent short contacts to provide reality reorientation, refocusing and direction  - Decrease environmental stimuli, including noise as appropriate  - Monitor and intervene to maintain adequate nutrition, hydration, elimination, sleep and activity  - Consider the need for a sitter if unable to leave patient unattended  - Initiate Spiritual Care consult as indicated  - Consult Pharmacy as needed  Outcome: Progressing

## 2025-05-02 ENCOUNTER — APPOINTMENT (OUTPATIENT)
Dept: CT IMAGING | Facility: HOSPITAL | Age: 89
End: 2025-05-02
Payer: MEDICARE

## 2025-05-02 ENCOUNTER — APPOINTMENT (OUTPATIENT)
Dept: GENERAL RADIOLOGY | Facility: HOSPITAL | Age: 89
End: 2025-05-02
Attending: STUDENT IN AN ORGANIZED HEALTH CARE EDUCATION/TRAINING PROGRAM
Payer: MEDICARE

## 2025-05-02 ENCOUNTER — HOSPITAL ENCOUNTER (EMERGENCY)
Facility: HOSPITAL | Age: 89
Discharge: HOME | End: 2025-05-03
Attending: STUDENT IN AN ORGANIZED HEALTH CARE EDUCATION/TRAINING PROGRAM
Payer: MEDICARE
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
## 2025-05-02 DIAGNOSIS — S80.02XA CONTUSION OF LEFT KNEE, INITIAL ENCOUNTER: ICD-10-CM

## 2025-05-02 PROCEDURE — 72125 CT NECK SPINE W/O DYE: CPT | Performed by: STUDENT IN AN ORGANIZED HEALTH CARE EDUCATION/TRAINING PROGRAM

## 2025-05-02 PROCEDURE — 70450 CT HEAD/BRAIN W/O DYE: CPT | Performed by: STUDENT IN AN ORGANIZED HEALTH CARE EDUCATION/TRAINING PROGRAM

## 2025-05-02 PROCEDURE — 99285 EMERGENCY DEPT VISIT HI MDM: CPT

## 2025-05-02 PROCEDURE — 73560 X-RAY EXAM OF KNEE 1 OR 2: CPT | Performed by: STUDENT IN AN ORGANIZED HEALTH CARE EDUCATION/TRAINING PROGRAM

## 2025-05-02 RX ORDER — ACETAMINOPHEN 500 MG
1000 TABLET ORAL ONCE
Status: COMPLETED | OUTPATIENT
Start: 2025-05-02 | End: 2025-05-02

## 2025-05-03 VITALS
TEMPERATURE: 98 F | OXYGEN SATURATION: 99 % | RESPIRATION RATE: 17 BRPM | HEART RATE: 79 BPM | SYSTOLIC BLOOD PRESSURE: 159 MMHG | DIASTOLIC BLOOD PRESSURE: 93 MMHG

## 2025-05-03 RX ORDER — TRAMADOL HYDROCHLORIDE 50 MG/1
50 TABLET ORAL ONCE
Status: COMPLETED | OUTPATIENT
Start: 2025-05-03 | End: 2025-05-03

## 2025-05-03 NOTE — DISCHARGE INSTRUCTIONS
Take Tylenol or ibuprofen as needed for pain  Return to ER for worsening pain numbness weakness or tingling, severe headache or neck pain or any new concerns  Call primary care doctor for follow-up

## 2025-05-03 NOTE — ED QUICK NOTES
Call made to [facility], where patient resides. Patient is concerned about being discharged to home because she does not have her keys with her. Per , Roni, they will be able to get her into her room (room 342). [facility] medicar set up with eta of 4am.

## 2025-05-03 NOTE — ED PROVIDER NOTES
Patient Seen in: [facility] Emergency Department      History     Chief Complaint   Patient presents with    Fall     Stated Complaint: FALL    Subjective:   HPI    89-year-old female history of hypothyroidism, hypertension presented for evaluation of a fall.  She had unwitnessed fall at assisted living facility.  She states that she lost her balance while in the bathroom and fell backwards in the back of her head in the shower.  She endorses back of head and neck pain as well as left knee pain.  C-collar was applied by EMS.  She denies blood thinner use.  Denies lightheadedness chest pain difficulty breathing.    History of Present Illness               Objective:     Past Medical History:    HYPOTHYROIDISM    Osteoarthritis    SOB (shortness of breath)    Thyroid disease    Unspecified essential hypertension              Past Surgical History:   Procedure Laterality Date    Cholecystectomy      D & c      Other surgical history  1986    fungus on lung                Social History     Socioeconomic History    Marital status:     Number of children: 4   Occupational History    Occupation: Retired   Tobacco Use    Smoking status: Never    Smokeless tobacco: Never   Vaping Use    Vaping status: Never Used   Substance and Sexual Activity    Alcohol use: No    Drug use: No    Sexual activity: Never   Other Topics Concern    Caffeine Concern No     Comment: Occas tea    Exercise Yes     Comment: Walks    Seat Belt Yes     Social Drivers of Health     Food Insecurity: No Food Insecurity (12/18/2024)    Food Insecurity     Food Insecurity: Never true   Transportation Needs: No Transportation Needs (12/18/2024)    Transportation Needs     Lack of Transportation: No   Housing Stability: Low Risk  (12/18/2024)    Housing Stability     Housing Instability: No                                Physical Exam     ED Triage Vitals [05/02/25 2232]   BP (!) 189/106   Pulse 89   Resp 19   Temp 97.6 °F (36.4 °C)   Temp  src Oral   SpO2 95 %   O2 Device None (Room air)       Current Vitals:   Vital Signs  BP: (!) 159/93  Pulse: 79  Resp: 17  Temp: 97.6 °F (36.4 °C)  Temp src: Oral  MAP (mmHg): (!) 109    Oxygen Therapy  SpO2: 99 %  O2 Device: None (Room air)        Physical Exam  Constitutional: awake, alert, no sig distress  HENT: mmm, no lesions,  Neck: normal range of motion, no tenderness, supple.  Eyes: PERRL, EOMI, conjunctiva normal, no discharge. Sclera anicteric.  Cardiovascular: rr no murmur  Respiratory: Normal breath sounds, no respiratory distress, no wheezing, no chest tenderness.  GI: Bowel sounds normal, Soft, no tenderness, no masses, no pulsatile masses.  : No CVA tenderness.  Skin: Warm, dry, no erythema, no rash.  Musculoskeletal: Intact distal pulses, no edema, no  mild tenderness anterior left knee without deformity. Good range of motion in all major joints. No tenderness to palpation or major deformities noted. Back- No tenderness.  Neurologic: Alert & oriented x 3, normal motor function, normal sensory function, no focal deficits noted.  Psych: Calm, cooperative, nl affect      Physical Exam                ED Course   Labs Reviewed - No data to display    ED Course as of 05/03/25 0406  ------------------------------------------------------------  Time: 05/03 0024  Comment: Reviewed x-ray right knee independently shows no acute fracture or dislocation.  ------------------------------------------------------------  Time: 05/03 0024  Comment: CT HEAD WITHOUT IV CONTRAST  CT CERVICAL SPINE WITHOUT IV CONTRAST      IMPRESSION:  CT HEAD:  -No evidence of acute intracranial abnormality.  -No acute calvarial fracture.    CT CERVICAL SPINE:  -No evidence of acute fracture or malalignment of the cervical spine.        The results were faxed/finalized only at 0046 hrs ET. If you would like to discuss this case directly please call [phone] (extension [phone]).  If you can't reach me at this number, do not leave a  voicemail.  Please call [phone] ext [phone] and ask for the next available Radiologist.        Tomy Thorpe MD, PhD    ------------------------------------------------------------  Time: 05/03 0024  Comment: I have independently reviewed patients CT brain and did not appreciate any acute intracranial abnormalities, no evidence for ICH skull fracture or mass lesion.    I have independently reviewed patient's CT cervical spine did not appreciate any acute bony abnormalities fractures or subluxations     Results                           MDM      89F hx as above who presents with mechanical fall  On arrival vss, reassuring  Ddx: ICH, Skull Fx, Cervical spinal fx, L knee contusion  Plan: traumatic imaging, reassess    On reevaluation patient improved, able to ambulate with walker which is her baseline.  Return precautions and follow-up instructions were discussed with patient who voiced understanding and agreement the plan.  All questions were answered to patient satisfaction.    Medical Decision Making      Disposition and Plan     Clinical Impression:  1. Fall, initial encounter    2. Contusion of left knee, initial encounter         Disposition:  Discharge  5/3/2025  1:45 am    Follow-up:  Amol Morgan, APRN  [address]  [address] [address] [phone]    Follow up in 2 day(s)      [facility] Emergency Department  [address] [facility] [id number] [phone]  Follow up  As needed, If symptoms worsen    We recommend that you schedule follow up care with a primary care provider within the next three months to obtain basic health screening including reassessment of your blood pressure.      Medications Prescribed:  Discharge Medication List as of 5/3/2025  1:56 AM          Supplementary Documentation:

## 2025-05-03 NOTE — ED INITIAL ASSESSMENT (HPI)
89y F to ED via EMS from assisted living facility for fall. Patient had an unwitnessed fall while in bathroom. Patient states she lost her balance and fell into the shower. Patient struck the back of her head on the shower floor but denies LOC. Skin WNL. Patient with c-collar applied, did complain of neck and head pain to medics.

## 2025-05-03 NOTE — ED QUICK NOTES
Patient requested that RN call patient's daughter, Madison ([phone]). Patient's daughter was notified that patient is here and a general plan of care was provided.

## (undated) DEVICE — 6 ML SYRINGE LUER-LOCK TIP: Brand: MONOJECT

## (undated) DEVICE — NEEDLE BLNT 18GA L1.5IN FILL DISP PRECISGLDE

## (undated) DEVICE — STANDARD HYPODERMIC NEEDLE,POLYPROPYLENE HUB: Brand: MONOJECT

## (undated) DEVICE — APPLICATOR PREP 10.5ML ORNG CHG 2% ISO ALC

## (undated) DEVICE — 12 ML SYRINGE LUER-LOCK TIP: Brand: MONOJECT

## (undated) DEVICE — GAMMEX® PI HYBRID SIZE 6.5, STERILE POWDER-FREE SURGICAL GLOVE, POLYISOPRENE AND NEOPRENE BLEND: Brand: GAMMEX

## (undated) DEVICE — TRAY EPI NDL 18GA L3.5IN 5ML GLS LUERLOCK LOR

## (undated) DEVICE — BANDAGE ADH 1.5X3IN FAB KNCK CURAD

## (undated) DEVICE — NEEDLE HYPO 18GA L1.5IN PNK SS PVT SHLD BVL

## (undated) DEVICE — SPONGE 4X4 10PK

## (undated) NOTE — LETTER
[facility] [address] , [place]   Date:   2/23/2023     Name:   Nadege [facility]    YOB: 1935   MRN:   [id number]       [facility]? Mili the areas on your body where you feel the described sensations. Use the appropriate symbol. Mary Angel the areas of radiation. Include all affected areas. Just to complete the picture, please draw in the face. ACHE:  ^ ^ ^   NUMBNESS:  0000   PINS & NEEDLES:  = = = =                              ^ ^ ^                       0000              = = = =                                    ^ ^ ^                       0000            = = = =      BURNING:  XXXX   STABBING: ////                  XXXX                ////                         XXXX          ////     Please mili the line below indicating your degree of pain right now  with 0 being no pain 10 being the worst pain possible.                                          0             1             2              3             4              5              6              7             8             9             10         Patient Signature: